# Patient Record
Sex: FEMALE | Race: WHITE | URBAN - METROPOLITAN AREA
[De-identification: names, ages, dates, MRNs, and addresses within clinical notes are randomized per-mention and may not be internally consistent; named-entity substitution may affect disease eponyms.]

---

## 2019-11-08 ENCOUNTER — OFFICE (OUTPATIENT)
Dept: URBAN - METROPOLITAN AREA CLINIC 67 | Facility: CLINIC | Age: 69
End: 2019-11-08

## 2019-11-08 VITALS — SYSTOLIC BLOOD PRESSURE: 135 MMHG | HEIGHT: 62 IN | WEIGHT: 220 LBS | DIASTOLIC BLOOD PRESSURE: 80 MMHG

## 2019-11-08 DIAGNOSIS — Z86.010 PERSONAL HISTORY COLON POLYPS: ICD-10-CM

## 2019-11-08 DIAGNOSIS — R13.10 DYSPHAGIA: ICD-10-CM

## 2019-11-08 PROCEDURE — 99243 OFF/OP CNSLTJ NEW/EST LOW 30: CPT | Performed by: NURSE PRACTITIONER

## 2019-11-08 NOTE — SERVICEHPINOTES
This is a   69   year old  female   seen   in consultation at the request of Dr. ANNA ZHAO  . Patient presents for a surveillance colonoscopy in light of personal hx of TA at her last colonoscopy by Dr Gilliam in 2014.  The patient has no family history of colon cancer or other significant GI diseases. Patient denies fever, nausea, vomiting, abdominal pain, change in bowel habits, constipation, diarrhea, rectal bleeding, melena, and significant change in weight. Reports new onset intermittent dysphagia w/ solids in the setting of occasional night-time heartburn. Denies shortness of breath and chest pain.

## 2020-01-29 ENCOUNTER — AMBULATORY SURGICAL CENTER (OUTPATIENT)
Dept: URBAN - METROPOLITAN AREA SURGERY 38 | Facility: SURGERY | Age: 70
End: 2020-01-29

## 2020-01-29 VITALS
OXYGEN SATURATION: 96 % | SYSTOLIC BLOOD PRESSURE: 120 MMHG | HEIGHT: 62 IN | RESPIRATION RATE: 20 BRPM | WEIGHT: 217 LBS | TEMPERATURE: 97.9 F | DIASTOLIC BLOOD PRESSURE: 63 MMHG | HEART RATE: 83 BPM

## 2020-01-29 DIAGNOSIS — K44.9 DIAPHRAGMATIC HERNIA WITHOUT OBSTRUCTION OR GANGRENE: ICD-10-CM

## 2020-01-29 DIAGNOSIS — K29.70 GASTRITIS, UNSPECIFIED, WITHOUT BLEEDING: ICD-10-CM

## 2020-01-29 DIAGNOSIS — C15.5 MALIGNANT NEOPLASM OF LOWER THIRD OF ESOPHAGUS: ICD-10-CM

## 2020-01-29 PROBLEM — K57.30 DVRTCLOS OF LG INT W/O PERFORATION OR ABSCESS W/O BLEEDING: Status: ACTIVE | Noted: 2020-01-29

## 2020-01-29 PROBLEM — D12.2 BENIGN NEOPLASM OF ASCENDING COLON: Status: ACTIVE | Noted: 2020-01-29

## 2020-01-29 PROBLEM — Z86.010 SURVEILLANCE DUE TO PRIOR COLONIC NEOPLASIA: Status: ACTIVE | Noted: 2020-01-29

## 2020-01-29 LAB — SURGICAL: PDF REPORT: (no result)

## 2020-01-29 PROCEDURE — 43239 EGD BIOPSY SINGLE/MULTIPLE: CPT | Performed by: INTERNAL MEDICINE

## 2020-01-29 PROCEDURE — 45380 COLONOSCOPY AND BIOPSY: CPT | Performed by: INTERNAL MEDICINE

## 2020-02-06 ENCOUNTER — OFFICE (OUTPATIENT)
Dept: URBAN - METROPOLITAN AREA CLINIC 66 | Facility: CLINIC | Age: 70
End: 2020-02-06

## 2020-02-06 VITALS — SYSTOLIC BLOOD PRESSURE: 135 MMHG | DIASTOLIC BLOOD PRESSURE: 90 MMHG | WEIGHT: 215 LBS | HEIGHT: 62 IN

## 2020-02-06 DIAGNOSIS — C15.9 MALIGNANT TUMOR OF ESOPHAGUS: ICD-10-CM

## 2020-02-06 PROCEDURE — 99214 OFFICE O/P EST MOD 30 MIN: CPT | Performed by: INTERNAL MEDICINE

## 2020-02-06 NOTE — SERVICEHPINOTES
ELICEO  KEANE   returns today for follow-up from last visit on   1/29/2020  .    Reviewed egd and path with her and gave copies.

## 2021-03-20 ENCOUNTER — HOSPITAL ENCOUNTER (INPATIENT)
Dept: HOSPITAL 54 - ER | Age: 71
LOS: 16 days | Discharge: HOSPICE | DRG: 64 | End: 2021-04-05
Attending: NURSE PRACTITIONER | Admitting: NURSE PRACTITIONER
Payer: MEDICARE

## 2021-03-20 VITALS — SYSTOLIC BLOOD PRESSURE: 105 MMHG | DIASTOLIC BLOOD PRESSURE: 63 MMHG

## 2021-03-20 VITALS — SYSTOLIC BLOOD PRESSURE: 113 MMHG | DIASTOLIC BLOOD PRESSURE: 71 MMHG

## 2021-03-20 VITALS — HEIGHT: 62 IN | WEIGHT: 183.38 LBS | BODY MASS INDEX: 33.75 KG/M2

## 2021-03-20 DIAGNOSIS — G92: ICD-10-CM

## 2021-03-20 DIAGNOSIS — L30.8: ICD-10-CM

## 2021-03-20 DIAGNOSIS — D69.59: ICD-10-CM

## 2021-03-20 DIAGNOSIS — C15.9: ICD-10-CM

## 2021-03-20 DIAGNOSIS — E43: ICD-10-CM

## 2021-03-20 DIAGNOSIS — R40.2142: ICD-10-CM

## 2021-03-20 DIAGNOSIS — E88.09: ICD-10-CM

## 2021-03-20 DIAGNOSIS — D72.18: ICD-10-CM

## 2021-03-20 DIAGNOSIS — R53.1: ICD-10-CM

## 2021-03-20 DIAGNOSIS — R29.720: ICD-10-CM

## 2021-03-20 DIAGNOSIS — C78.7: ICD-10-CM

## 2021-03-20 DIAGNOSIS — D64.9: ICD-10-CM

## 2021-03-20 DIAGNOSIS — L30.9: ICD-10-CM

## 2021-03-20 DIAGNOSIS — I10: ICD-10-CM

## 2021-03-20 DIAGNOSIS — K74.60: ICD-10-CM

## 2021-03-20 DIAGNOSIS — L51.9: ICD-10-CM

## 2021-03-20 DIAGNOSIS — R40.2242: ICD-10-CM

## 2021-03-20 DIAGNOSIS — Z87.891: ICD-10-CM

## 2021-03-20 DIAGNOSIS — D68.69: ICD-10-CM

## 2021-03-20 DIAGNOSIS — Z66: ICD-10-CM

## 2021-03-20 DIAGNOSIS — E03.9: ICD-10-CM

## 2021-03-20 DIAGNOSIS — R40.2362: ICD-10-CM

## 2021-03-20 DIAGNOSIS — N17.0: ICD-10-CM

## 2021-03-20 DIAGNOSIS — I21.4: ICD-10-CM

## 2021-03-20 DIAGNOSIS — I63.9: Primary | ICD-10-CM

## 2021-03-20 DIAGNOSIS — E86.0: ICD-10-CM

## 2021-03-20 DIAGNOSIS — Z20.822: ICD-10-CM

## 2021-03-20 DIAGNOSIS — J45.901: ICD-10-CM

## 2021-03-20 LAB
ALBUMIN SERPL BCP-MCNC: 2.1 G/DL (ref 3.4–5)
ALP SERPL-CCNC: 294 U/L (ref 46–116)
ALT SERPL W P-5'-P-CCNC: 32 U/L (ref 12–78)
AMMONIA PLAS-SCNC: 15 UMOL/L (ref 11–32)
APAP SERPL-MCNC: < 10 UG/ML (ref 10–30)
AST SERPL W P-5'-P-CCNC: 53 U/L (ref 15–37)
BASOPHILS # BLD AUTO: 0 /CMM (ref 0–0.2)
BASOPHILS NFR BLD AUTO: 0.2 % (ref 0–2)
BILIRUB DIRECT SERPL-MCNC: 0.3 MG/DL (ref 0–0.2)
BILIRUB SERPL-MCNC: 0.7 MG/DL (ref 0.2–1)
BILIRUB UR QL STRIP: (no result)
BUN SERPL-MCNC: 21 MG/DL (ref 7–18)
CALCIUM SERPL-MCNC: 7.9 MG/DL (ref 8.5–10.1)
CHLORIDE SERPL-SCNC: 108 MMOL/L (ref 98–107)
CO2 SERPL-SCNC: 23 MMOL/L (ref 21–32)
COLOR UR: YELLOW
CREAT SERPL-MCNC: 0.9 MG/DL (ref 0.6–1.3)
EOSINOPHIL NFR BLD AUTO: 28.4 % (ref 0–6)
EOSINOPHIL NFR BLD MANUAL: 31 % (ref 0–4)
ETHANOL SERPL-MCNC: < 3 MG/DL (ref 0–0)
GLUCOSE SERPL-MCNC: 85 MG/DL (ref 74–106)
GLUCOSE UR STRIP-MCNC: NEGATIVE MG/DL
HCT VFR BLD AUTO: 38 % (ref 33–45)
HGB BLD-MCNC: 12 G/DL (ref 11.5–14.8)
LEUKOCYTE ESTERASE UR QL STRIP: NEGATIVE
LYMPHOCYTES NFR BLD AUTO: 0.8 /CMM (ref 0.8–4.8)
LYMPHOCYTES NFR BLD AUTO: 4.8 % (ref 20–44)
LYMPHOCYTES NFR BLD MANUAL: 3 % (ref 16–48)
MCHC RBC AUTO-ENTMCNC: 31 G/DL (ref 31–36)
MCV RBC AUTO: 91 FL (ref 82–100)
MONOCYTES NFR BLD AUTO: 1.1 /CMM (ref 0.1–1.3)
MONOCYTES NFR BLD AUTO: 6.9 % (ref 2–12)
MONOCYTES NFR BLD MANUAL: 6 % (ref 0–11)
NEUTROPHILS # BLD AUTO: 9.6 /CMM (ref 1.8–8.9)
NEUTROPHILS NFR BLD AUTO: 59.7 % (ref 43–81)
NEUTS BAND NFR BLD MANUAL: 1 % (ref 0–5)
NEUTS SEG NFR BLD MANUAL: 59 % (ref 42–76)
NITRITE UR QL STRIP: NEGATIVE
PH UR STRIP: 6 [PH] (ref 5–8)
PLATELET # BLD AUTO: 68 /CMM (ref 150–450)
POTASSIUM SERPL-SCNC: 4.7 MMOL/L (ref 3.5–5.1)
PROT SERPL-MCNC: 6.2 G/DL (ref 6.4–8.2)
PROT UR QL STRIP: (no result) MG/DL
RBC # BLD AUTO: 4.19 MIL/UL (ref 4–5.2)
RBC #/AREA URNS HPF: (no result) /HPF (ref 0–2)
SODIUM SERPL-SCNC: 142 MMOL/L (ref 136–145)
TSH SERPL DL<=0.005 MIU/L-ACNC: 3.91 UIU/ML (ref 0.36–3.74)
UROBILINOGEN UR STRIP-MCNC: 0.2 EU/DL
WBC #/AREA URNS HPF: (no result) /HPF (ref 0–3)
WBC NRBC COR # BLD AUTO: 16 K/UL (ref 4.3–11)

## 2021-03-20 PROCEDURE — C9803 HOPD COVID-19 SPEC COLLECT: HCPCS

## 2021-03-20 PROCEDURE — G0480 DRUG TEST DEF 1-7 CLASSES: HCPCS

## 2021-03-20 PROCEDURE — A9575 INJ GADOTERATE MEGLUMI 0.1ML: HCPCS

## 2021-03-20 PROCEDURE — C9113 INJ PANTOPRAZOLE SODIUM, VIA: HCPCS

## 2021-03-20 PROCEDURE — G0378 HOSPITAL OBSERVATION PER HR: HCPCS

## 2021-03-20 RX ADMIN — LORATADINE SCH MG: 10 TABLET ORAL at 18:13

## 2021-03-20 RX ADMIN — SODIUM CHLORIDE PRN MLS/HR: 4.5 INJECTION, SOLUTION INTRAVENOUS at 17:31

## 2021-03-20 NOTE — NUR
TELE RN OPENING NOTES: RECEIVED PATIENT IN BED,AWAKE. A/O X3. NO S/S OF DISTRESS NOTED. CALL 
LIGHT WITHIN REACH. BED ALARM ON. BED IN LOWEST ANDLOCKED POSITION. HOB ELEVATED.

## 2021-03-20 NOTE — NUR
THE PATIENT IS BIBRA 102 FROM HOME C/O ALTERED MENTAL STATUS AND PER REPORT "WE 
FOUND THE PT ON THE FLOOR". THE PATIENT IS ALERT AND ORIENTED TO SELF AND 
PLACE. ABLE TO MAKE NEEDS KNOWN VERBALLY. IN ROOM AIR AND DENIES SOB. 
RESPIRATION REGULAR AND UNLABORED. DENIES PAIN. THE PATIENT CAME WITH LAC G 18. 
ALSO, NOTED LEFT UPPER CHEST UNDERSKIN PORT A CATH. THE PATIENT IS PROVIDED 
WITH A WARM BLANKET FOR COMFORT. ATTACHED TO THE MONITOR. WILL CONTINUE TO 
MONITOR.

.

## 2021-03-20 NOTE — NUR
TELE RN ADMITTING NOTES



RECEIVED PATIENT VIA [facility] IN MEDICALLY STABLE CONDITION. VITAL SIGNS: BP: 113/71 HR 87 RR 
18 TEMP 97.6 O2 96%

SAFETY PRECAUTIONS IN PLACE; BED IN LOW POSITION AND LOCKED, RAILS UP X2, CALL LIGHT WITHIN 
REACH. WILL CONTINUE TO MONITOR PATIENT.

## 2021-03-20 NOTE — NUR
TELE RN CLOSING NOTES



PATIENT RESTING IN BED, A/O X2. PATIENT ON ROOM AIR; BREATHING EVEN AND UNLABORED; NO SOB 
NOTED AT THIS TIME. NO COMPLAINS OF PAIN. TELE MONITOR WITH A CURRENT READING OF SR 87. IV 
ACCESS PRESENT ON LAC G #18; INFUSING .45NS @75 MLS/HR. SAFETY PRECAUTIONS IN PLACE; BED IN 
LOW POSITION AND LOCKED, RAILS UP X2, CALL LIGHT WITHIN REACH. WILL ENDORSE TO NIGHT SHIFT 
NURSE.

## 2021-03-21 VITALS — SYSTOLIC BLOOD PRESSURE: 117 MMHG | DIASTOLIC BLOOD PRESSURE: 65 MMHG

## 2021-03-21 VITALS — DIASTOLIC BLOOD PRESSURE: 63 MMHG | SYSTOLIC BLOOD PRESSURE: 109 MMHG

## 2021-03-21 VITALS — DIASTOLIC BLOOD PRESSURE: 77 MMHG | SYSTOLIC BLOOD PRESSURE: 114 MMHG

## 2021-03-21 VITALS — DIASTOLIC BLOOD PRESSURE: 76 MMHG | SYSTOLIC BLOOD PRESSURE: 125 MMHG

## 2021-03-21 LAB
ALBUMIN SERPL BCP-MCNC: 1.9 G/DL (ref 3.4–5)
ALP SERPL-CCNC: 289 U/L (ref 46–116)
ALT SERPL W P-5'-P-CCNC: 27 U/L (ref 12–78)
AST SERPL W P-5'-P-CCNC: 52 U/L (ref 15–37)
BASOPHILS # BLD AUTO: 0 /CMM (ref 0–0.2)
BASOPHILS NFR BLD AUTO: 0.3 % (ref 0–2)
BILIRUB SERPL-MCNC: 0.5 MG/DL (ref 0.2–1)
BUN SERPL-MCNC: 25 MG/DL (ref 7–18)
CALCIUM SERPL-MCNC: 7.5 MG/DL (ref 8.5–10.1)
CHLORIDE SERPL-SCNC: 110 MMOL/L (ref 98–107)
CHOLEST SERPL-MCNC: 131 MG/DL (ref ?–200)
CO2 SERPL-SCNC: 20 MMOL/L (ref 21–32)
CREAT SERPL-MCNC: 0.9 MG/DL (ref 0.6–1.3)
EOSINOPHIL NFR BLD AUTO: 39.5 % (ref 0–6)
EOSINOPHIL NFR BLD MANUAL: 35 % (ref 0–4)
FERRITIN SERPL-MCNC: 346 NG/ML (ref 8–388)
GLUCOSE SERPL-MCNC: 85 MG/DL (ref 74–106)
HCT VFR BLD AUTO: 35 % (ref 33–45)
HDLC SERPL-MCNC: 30 MG/DL (ref 40–60)
HGB BLD-MCNC: 11.2 G/DL (ref 11.5–14.8)
IRON SERPL-MCNC: 38 UG/DL (ref 50–175)
LDLC SERPL DIRECT ASSAY-MCNC: 83 MG/DL (ref 0–99)
LYMPHOCYTES NFR BLD AUTO: 1 /CMM (ref 0.8–4.8)
LYMPHOCYTES NFR BLD AUTO: 6.7 % (ref 20–44)
LYMPHOCYTES NFR BLD MANUAL: 7 % (ref 16–48)
MAGNESIUM SERPL-MCNC: 2.4 MG/DL (ref 1.8–2.4)
MCHC RBC AUTO-ENTMCNC: 32 G/DL (ref 31–36)
MCV RBC AUTO: 92 FL (ref 82–100)
MONOCYTES NFR BLD AUTO: 1.1 /CMM (ref 0.1–1.3)
MONOCYTES NFR BLD AUTO: 7.5 % (ref 2–12)
MONOCYTES NFR BLD MANUAL: 6 % (ref 0–11)
NEUTROPHILS # BLD AUTO: 6.6 /CMM (ref 1.8–8.9)
NEUTROPHILS NFR BLD AUTO: 46 % (ref 43–81)
NEUTS SEG NFR BLD MANUAL: 52 % (ref 42–76)
PHOSPHATE SERPL-MCNC: 2.8 MG/DL (ref 2.5–4.9)
PLATELET # BLD AUTO: 67 /CMM (ref 150–450)
POTASSIUM SERPL-SCNC: 4.3 MMOL/L (ref 3.5–5.1)
PROT SERPL-MCNC: 5.8 G/DL (ref 6.4–8.2)
RBC # BLD AUTO: 3.82 MIL/UL (ref 4–5.2)
SODIUM SERPL-SCNC: 141 MMOL/L (ref 136–145)
TIBC SERPL-MCNC: 200 UG/DL (ref 250–450)
TRIGL SERPL-MCNC: 119 MG/DL (ref 30–150)
WBC NRBC COR # BLD AUTO: 14.4 K/UL (ref 4.3–11)

## 2021-03-21 RX ADMIN — Medication SCH MG: at 16:04

## 2021-03-21 RX ADMIN — ALBUTEROL SULFATE SCH MG: 2.5 SOLUTION RESPIRATORY (INHALATION) at 16:04

## 2021-03-21 RX ADMIN — ALBUTEROL SULFATE SCH MG: 2.5 SOLUTION RESPIRATORY (INHALATION) at 19:22

## 2021-03-21 RX ADMIN — ALBUTEROL SULFATE SCH MG: 2.5 SOLUTION RESPIRATORY (INHALATION) at 09:37

## 2021-03-21 RX ADMIN — SODIUM CHLORIDE PRN MLS/HR: 4.5 INJECTION, SOLUTION INTRAVENOUS at 18:24

## 2021-03-21 RX ADMIN — Medication SCH MG: at 09:36

## 2021-03-21 RX ADMIN — SODIUM CHLORIDE PRN MLS/HR: 4.5 INJECTION, SOLUTION INTRAVENOUS at 07:36

## 2021-03-21 RX ADMIN — METOPROLOL TARTRATE SCH MG: 50 TABLET, FILM COATED ORAL at 17:22

## 2021-03-21 RX ADMIN — Medication SCH MG: at 11:30

## 2021-03-21 RX ADMIN — METOPROLOL TARTRATE SCH MG: 50 TABLET, FILM COATED ORAL at 23:23

## 2021-03-21 RX ADMIN — Medication SCH GM: at 17:24

## 2021-03-21 RX ADMIN — Medication SCH MG: at 19:22

## 2021-03-21 RX ADMIN — ALBUTEROL SULFATE SCH MG: 2.5 SOLUTION RESPIRATORY (INHALATION) at 12:18

## 2021-03-21 RX ADMIN — Medication SCH GM: at 11:45

## 2021-03-21 RX ADMIN — LORATADINE SCH MG: 10 TABLET ORAL at 08:15

## 2021-03-21 RX ADMIN — METOPROLOL TARTRATE SCH MG: 50 TABLET, FILM COATED ORAL at 12:00

## 2021-03-21 NOTE — NUR
MS/RN  CLOSING NOTE



PATIENT IS AWAKE IN BED. A/O X 3. NO ACUTE DISTRESS NOTED, DENIES ANY PAIN AT THIS TIME. 
PATIENT ON ROOM AIR TOLERATING WELL, NO SOB NOTED, BREATHING EVEN, NON LABORED. SAFETY 
MEASURES IN PLACE, BED LOCKED AND IN LOWEST POSITION, CALL LIGHT WITHIN REACH. ALL NEEDS MET 
THROUGHOUT THE SHIFT. WILL ENDORSE TO NIGHT SHIFT NURSE.

## 2021-03-21 NOTE — NUR
MS/RN  OPENING NOTE



RECEIVED PATIENT FROM NIGHT SHIFT NURSE.

PATIENT IS AWAKE IN BED. A/O X 3. NO ACUTE DISTRESS NOTED, DENIES ANY PAIN AT THIS TIME. 
PATIENT ON ROOM AIR TOLERATING WELL, NO SOB NOTED, BREATHING EVEN, NON LABORED. PER NIGHT 
SHIFT REPORT PATIENT DID NOT VOID AND HAD BLADDER SCANNED SHOWING 253ML, WITH NO COMPLAINTS 
OF PAIN/DISCOMFORT, PRESSURE, OR URGENCY. NIGHT SHIFT NURSE NOTIFIED MD, AWAITING RESPONSE. 
SAFETY MEASURES IN PLACE, BED LOCKED AND IN LOWEST POSITION, CALL LIGHT WITHIN REACH. WILL 
CONTINUE TO MONITOR AND ENSURE SAFETY.

## 2021-03-21 NOTE — NUR
RN NOTES

NEW ORDER OF METHYLPREDNISONE, PER SISTER AND BA WHEELER, TO HOLD MEDICATION, NEEDS 
APPROVAL OF PATIENT'S REGULAR ONCOLOGIST. ASKED PATIENT ABOUT INFORMATION ON ONCOLOGIST, 
PATIENT UNABLE TO GIVE INFORMATION DUE TO WEAKNESS, UNABLE TO TALK, ONLY MOUTH'S WORDS. 
SISTER ALSO AGREEABLE TO HOLD MEDICATION FOR NOW.

-------------------------------------------------------------------------------

Addendum: 03/22/21 at 0328 by EAGLE ROSAS RN

-------------------------------------------------------------------------------

PATIENT AGREEABLE TO HOLD STEROIDS FOR NOW.

## 2021-03-22 VITALS — DIASTOLIC BLOOD PRESSURE: 58 MMHG | SYSTOLIC BLOOD PRESSURE: 104 MMHG

## 2021-03-22 VITALS — SYSTOLIC BLOOD PRESSURE: 132 MMHG | DIASTOLIC BLOOD PRESSURE: 81 MMHG

## 2021-03-22 VITALS — SYSTOLIC BLOOD PRESSURE: 111 MMHG | DIASTOLIC BLOOD PRESSURE: 68 MMHG

## 2021-03-22 VITALS — SYSTOLIC BLOOD PRESSURE: 129 MMHG | DIASTOLIC BLOOD PRESSURE: 69 MMHG

## 2021-03-22 VITALS — DIASTOLIC BLOOD PRESSURE: 80 MMHG | SYSTOLIC BLOOD PRESSURE: 122 MMHG

## 2021-03-22 LAB
ALBUMIN SERPL BCP-MCNC: 1.7 G/DL (ref 3.4–5)
ALP SERPL-CCNC: 236 U/L (ref 46–116)
ALT SERPL W P-5'-P-CCNC: 22 U/L (ref 12–78)
AST SERPL W P-5'-P-CCNC: 53 U/L (ref 15–37)
BASOPHILS # BLD AUTO: 0 /CMM (ref 0–0.2)
BASOPHILS NFR BLD AUTO: 0.2 % (ref 0–2)
BILIRUB SERPL-MCNC: 0.7 MG/DL (ref 0.2–1)
BUN SERPL-MCNC: 24 MG/DL (ref 7–18)
CALCIUM SERPL-MCNC: 7.1 MG/DL (ref 8.5–10.1)
CHLORIDE SERPL-SCNC: 110 MMOL/L (ref 98–107)
CO2 SERPL-SCNC: 21 MMOL/L (ref 21–32)
CREAT SERPL-MCNC: 0.8 MG/DL (ref 0.6–1.3)
EOSINOPHIL NFR BLD AUTO: 34.3 % (ref 0–6)
EOSINOPHIL NFR BLD MANUAL: 33 % (ref 0–4)
FERRITIN SERPL-MCNC: 374 NG/ML (ref 8–388)
GLUCOSE SERPL-MCNC: 88 MG/DL (ref 74–106)
HCT VFR BLD AUTO: 37 % (ref 33–45)
HGB BLD-MCNC: 11.5 G/DL (ref 11.5–14.8)
IRON SERPL-MCNC: 47 UG/DL (ref 50–175)
LYMPHOCYTES NFR BLD AUTO: 0.9 /CMM (ref 0.8–4.8)
LYMPHOCYTES NFR BLD AUTO: 5.5 % (ref 20–44)
LYMPHOCYTES NFR BLD MANUAL: 0 % (ref 16–48)
MAGNESIUM SERPL-MCNC: 2.3 MG/DL (ref 1.8–2.4)
MCHC RBC AUTO-ENTMCNC: 31 G/DL (ref 31–36)
MCV RBC AUTO: 93 FL (ref 82–100)
MONOCYTES NFR BLD AUTO: 1.1 /CMM (ref 0.1–1.3)
MONOCYTES NFR BLD AUTO: 6.5 % (ref 2–12)
MONOCYTES NFR BLD MANUAL: 4 % (ref 0–11)
NEUTROPHILS # BLD AUTO: 9.1 /CMM (ref 1.8–8.9)
NEUTROPHILS NFR BLD AUTO: 53.5 % (ref 43–81)
NEUTS SEG NFR BLD MANUAL: 63 % (ref 42–76)
PHOSPHATE SERPL-MCNC: 2.4 MG/DL (ref 2.5–4.9)
PLATELET # BLD AUTO: 47 /CMM (ref 150–450)
POTASSIUM SERPL-SCNC: 4.4 MMOL/L (ref 3.5–5.1)
PROT SERPL-MCNC: 5.4 G/DL (ref 6.4–8.2)
RBC # BLD AUTO: 3.95 MIL/UL (ref 4–5.2)
SODIUM SERPL-SCNC: 141 MMOL/L (ref 136–145)
TIBC SERPL-MCNC: 190 UG/DL (ref 250–450)
WBC NRBC COR # BLD AUTO: 17 K/UL (ref 4.3–11)

## 2021-03-22 RX ADMIN — METOPROLOL TARTRATE SCH MG: 50 TABLET, FILM COATED ORAL at 17:18

## 2021-03-22 RX ADMIN — Medication SCH GM: at 08:50

## 2021-03-22 RX ADMIN — LORATADINE SCH MG: 10 TABLET ORAL at 08:51

## 2021-03-22 RX ADMIN — ALBUTEROL SULFATE SCH MG: 2.5 SOLUTION RESPIRATORY (INHALATION) at 20:23

## 2021-03-22 RX ADMIN — SODIUM CHLORIDE PRN MLS/HR: 4.5 INJECTION, SOLUTION INTRAVENOUS at 06:49

## 2021-03-22 RX ADMIN — Medication SCH MG: at 20:23

## 2021-03-22 RX ADMIN — ALBUTEROL SULFATE SCH MG: 2.5 SOLUTION RESPIRATORY (INHALATION) at 11:10

## 2021-03-22 RX ADMIN — LACTULOSE SCH MLS/HR: 20 SOLUTION ORAL at 20:30

## 2021-03-22 RX ADMIN — Medication SCH MG: at 15:25

## 2021-03-22 RX ADMIN — METOPROLOL TARTRATE SCH MG: 50 TABLET, FILM COATED ORAL at 12:00

## 2021-03-22 RX ADMIN — Medication SCH GM: at 17:23

## 2021-03-22 RX ADMIN — METOPROLOL TARTRATE SCH MG: 50 TABLET, FILM COATED ORAL at 05:52

## 2021-03-22 RX ADMIN — LACTULOSE SCH MLS/HR: 20 SOLUTION ORAL at 11:49

## 2021-03-22 RX ADMIN — Medication SCH MG: at 08:27

## 2021-03-22 RX ADMIN — Medication SCH MG: at 11:11

## 2021-03-22 RX ADMIN — SODIUM CHLORIDE PRN MLS/HR: 4.5 INJECTION, SOLUTION INTRAVENOUS at 17:18

## 2021-03-22 RX ADMIN — LACTULOSE SCH MLS/HR: 20 SOLUTION ORAL at 15:55

## 2021-03-22 RX ADMIN — ALBUTEROL SULFATE SCH MG: 2.5 SOLUTION RESPIRATORY (INHALATION) at 15:25

## 2021-03-22 RX ADMIN — ALBUTEROL SULFATE SCH MG: 2.5 SOLUTION RESPIRATORY (INHALATION) at 08:27

## 2021-03-22 NOTE — NUR
RN Closing note



Patient in bed, lethargic condition but stable all vital signs. Continue to given Lactulose 
enema for high ammonia level. Patient done ST evel and kept hold oral intake include 
medications.due to patient is not awake. Faxed over and requested medical record to [facility] 
oncology/Dr. Ruby Meadows. Respiratory even and unlabored on room air, skin is warm to touch, 
keep clean/dry, skin care provided. Kept elevated HOB for ensure airway and aspiration 
precaution,  also lowest bed position for safety. Call light within reach, will endorse 
night shift.

## 2021-03-22 NOTE — NUR
Patient seen by ST who recommended hold oral intake or medications due to does not awake 
condition.

## 2021-03-22 NOTE — NUR
:



 consult was requested per MD as the patient lives alone. Patient is a 
70-year-old, white female. Per ED physician, patient was admitted on 03/20/2021 due to 
altered mental status after being found on the floor of her home ([address] [place] [address]; [phone]).



SW attempted to meet with the patient in her hospital room on the med-surgical unit. Patient 
was lethargic and therefore SW was unable to interview the patient.



MELVI spoke to KARLA Suarez who had reached out to the patients sister Radha ([phone]). KARLA Suarez provided this SW with Sangeeta contact information. 



MELVI called patients sister Radha, and spoke to Radha to discuss coordination of care. 
Radha mentioned that the patient is independent but has had some trouble standing for 
the past couple of weeks. MELVI asked Radha if the patient is independent with her ADLs 
and she reported that the patient can care for herself. MELVI asked Radha about the 
patients finances and she reported that the patient receives pension, Medicare, and Social 
Security Income. MELVI discussed discharge plan with Radha: home v. alternative option. 
Patient's family is unsure about discharge options at this time. 



MELVI will work nursing and case management, as needed, to ensure a safe and proper discharge 
plan.  SW will follow-up with patient, and will remain available to the patient and family, 
as needed.

## 2021-03-22 NOTE — NUR
RN Opening note



Received patient in bed, lethargic. Patient having US ABD. Respiratory even and unlabored on 
room air. Skin is warm to touch, keep clean/dry, intact IV site, and keep remains intact 
placement. Kept elevated HOB for ensure airway and aspiration precaution, also lowest bed 
position for safety. Noticed PLT 47, Trop 1.884 and MD aware NNO. Call light within reach, 
will continue to monitor.

## 2021-03-22 NOTE — NUR
RN Opening note



Received patient in bed, AO x 2-3, able to responds all stimuli. Patient having US ABD. 
Respiratory even and unlabored on room air. Skin is warm to touch, keep clean/dry, intact IV 
site, and keep remains intact placement. Kept elevated HOB for ensure airway and aspiration 
precaution, also lowest bed position for safety. Call light within reach, will continue to 
monitor.

-------------------------------------------------------------------------------

Addendum: 03/22/21 at 0815 by ANGELICA KRAUSE RN

-------------------------------------------------------------------------------

Error

## 2021-03-22 NOTE — NUR
RN NOTES PM SHIFT

LETHARGIC, AROUSEABLE BY VOICE AND TOUCH, ALERT AND ORIENTED X1, ON ROOM AIR, NO COMPLAIN OF 
PAIN, UNABLE TO TALK, MOUTHS WORDS, SHAKES AND NOD HEAD FOR YES OR NO, HX OF ESOPHAGEAL 
CANCER, SR ON THE TELE, PER DR. ALONZO, RULE OUT AUTO IMMUNE INDUCED ENCEPHALOPATHY, AM LABS. 
WILL NEED MEDICAL RECORDS FROM [facility] ONCOLOGY DR. TERESA LEE [phone]



NOTIFIED DR. TUCKER REGARDING TROPONIN 1.884, NO NEW ORDER, ALSO NOTIFIED DR. ALONZO REGARDING 
PLATELET OF 47, AWAITING RESPONSE.

## 2021-03-23 VITALS — DIASTOLIC BLOOD PRESSURE: 66 MMHG | SYSTOLIC BLOOD PRESSURE: 96 MMHG

## 2021-03-23 VITALS — DIASTOLIC BLOOD PRESSURE: 57 MMHG | SYSTOLIC BLOOD PRESSURE: 113 MMHG

## 2021-03-23 VITALS — SYSTOLIC BLOOD PRESSURE: 108 MMHG | DIASTOLIC BLOOD PRESSURE: 76 MMHG

## 2021-03-23 VITALS — SYSTOLIC BLOOD PRESSURE: 90 MMHG | DIASTOLIC BLOOD PRESSURE: 56 MMHG

## 2021-03-23 VITALS — DIASTOLIC BLOOD PRESSURE: 80 MMHG | SYSTOLIC BLOOD PRESSURE: 118 MMHG

## 2021-03-23 LAB
ALBUMIN SERPL ELPH-MCNC: 1.9 G/DL (ref 2.9–4.4)
ALBUMIN/GLOB SERPL: 0.6 {RATIO} (ref 0.7–1.7)
ALPHA1 GLOB SERPL ELPH-MCNC: 0.4 G/DL (ref 0–0.4)
ALPHA2 GLOB SERPL ELPH-MCNC: 0.7 G/DL (ref 0.4–1)
B-GLOBULIN SERPL ELPH-MCNC: 0.8 G/DL (ref 0.7–1.3)
BASOPHILS # BLD AUTO: 0 /CMM (ref 0–0.2)
BASOPHILS NFR BLD AUTO: 0.2 % (ref 0–2)
BUN SERPL-MCNC: 30 MG/DL (ref 7–18)
CALCIUM SERPL-MCNC: 7 MG/DL (ref 8.5–10.1)
CENTROMERE B AB SER-ACNC: <0.2 AI (ref 0–0.9)
CHLORIDE SERPL-SCNC: 110 MMOL/L (ref 98–107)
CHROMATIN AB SERPL-ACNC: <0.2 AI (ref 0–0.9)
CO2 SERPL-SCNC: 20 MMOL/L (ref 21–32)
CREAT SERPL-MCNC: 0.8 MG/DL (ref 0.6–1.3)
DSDNA AB SER-ACNC: <1 IU/ML (ref 0–9)
ENA JO1 AB SER-ACNC: <0.2 AI (ref 0–0.9)
ENA RNP AB SER-ACNC: <0.2 AI (ref 0–0.9)
ENA SCL70 AB SER-ACNC: <0.2 AI (ref 0–0.9)
ENA SM AB SER-ACNC: <0.2 AI (ref 0–0.9)
ENA SS-A AB SER-ACNC: <0.2 AI (ref 0–0.9)
ENA SS-B AB SER-ACNC: <0.2 AI (ref 0–0.9)
EOSINOPHIL NFR BLD AUTO: 0.4 % (ref 0–6)
GAMMA GLOB SERPL ELPH-MCNC: 1.3 G/DL (ref 0.4–1.8)
GLOBULIN SER CALC-MCNC: 3.1 G/DL (ref 2.2–3.9)
GLUCOSE SERPL-MCNC: 142 MG/DL (ref 74–106)
HCT VFR BLD AUTO: 34 % (ref 33–45)
HGB BLD-MCNC: 10.7 G/DL (ref 11.5–14.8)
IGA SERPL-MCNC: 283 MG/DL (ref 87–352)
IGM SERPL-MCNC: 116 MG/DL (ref 26–217)
LYMPHOCYTES NFR BLD AUTO: 0.9 /CMM (ref 0.8–4.8)
LYMPHOCYTES NFR BLD AUTO: 7.6 % (ref 20–44)
MAGNESIUM SERPL-MCNC: 2.6 MG/DL (ref 1.8–2.4)
MCHC RBC AUTO-ENTMCNC: 31 G/DL (ref 31–36)
MCV RBC AUTO: 94 FL (ref 82–100)
MONOCYTES NFR BLD AUTO: 1 /CMM (ref 0.1–1.3)
MONOCYTES NFR BLD AUTO: 8.7 % (ref 2–12)
NEUTROPHILS # BLD AUTO: 9.6 /CMM (ref 1.8–8.9)
NEUTROPHILS NFR BLD AUTO: 83.1 % (ref 43–81)
PHOSPHATE SERPL-MCNC: 2.9 MG/DL (ref 2.5–4.9)
PLATELET # BLD AUTO: 57 /CMM (ref 150–450)
POTASSIUM SERPL-SCNC: 4.1 MMOL/L (ref 3.5–5.1)
RBC # BLD AUTO: 3.62 MIL/UL (ref 4–5.2)
SODIUM SERPL-SCNC: 140 MMOL/L (ref 136–145)
WBC NRBC COR # BLD AUTO: 11.5 K/UL (ref 4.3–11)

## 2021-03-23 PROCEDURE — 0HBBXZX EXCISION OF RIGHT UPPER ARM SKIN, EXTERNAL APPROACH, DIAGNOSTIC: ICD-10-PCS

## 2021-03-23 RX ADMIN — DEXTROSE MONOHYDRATE SCH MLS/HR: 50 INJECTION, SOLUTION INTRAVENOUS at 01:16

## 2021-03-23 RX ADMIN — DEXTROSE MONOHYDRATE SCH MLS/HR: 50 INJECTION, SOLUTION INTRAVENOUS at 20:41

## 2021-03-23 RX ADMIN — DEXTROSE MONOHYDRATE SCH MLS/HR: 50 INJECTION, SOLUTION INTRAVENOUS at 17:21

## 2021-03-23 RX ADMIN — METOPROLOL TARTRATE SCH MG: 50 TABLET, FILM COATED ORAL at 12:00

## 2021-03-23 RX ADMIN — ALBUTEROL SULFATE SCH MG: 2.5 SOLUTION RESPIRATORY (INHALATION) at 15:41

## 2021-03-23 RX ADMIN — Medication SCH MG: at 15:41

## 2021-03-23 RX ADMIN — LACTULOSE SCH MLS/HR: 20 SOLUTION ORAL at 00:33

## 2021-03-23 RX ADMIN — LACTULOSE SCH MLS/HR: 20 SOLUTION ORAL at 20:39

## 2021-03-23 RX ADMIN — ALBUTEROL SULFATE SCH MG: 2.5 SOLUTION RESPIRATORY (INHALATION) at 10:27

## 2021-03-23 RX ADMIN — METOPROLOL TARTRATE SCH MG: 50 TABLET, FILM COATED ORAL at 00:00

## 2021-03-23 RX ADMIN — METOPROLOL TARTRATE SCH MG: 50 TABLET, FILM COATED ORAL at 23:54

## 2021-03-23 RX ADMIN — LACTULOSE SCH MLS/HR: 20 SOLUTION ORAL at 15:22

## 2021-03-23 RX ADMIN — LACTULOSE SCH MLS/HR: 20 SOLUTION ORAL at 04:36

## 2021-03-23 RX ADMIN — ALBUTEROL SULFATE SCH MG: 2.5 SOLUTION RESPIRATORY (INHALATION) at 20:20

## 2021-03-23 RX ADMIN — LACTULOSE SCH MLS/HR: 20 SOLUTION ORAL at 13:07

## 2021-03-23 RX ADMIN — ALBUTEROL SULFATE SCH MG: 2.5 SOLUTION RESPIRATORY (INHALATION) at 07:33

## 2021-03-23 RX ADMIN — METOPROLOL TARTRATE SCH MG: 50 TABLET, FILM COATED ORAL at 06:00

## 2021-03-23 RX ADMIN — Medication SCH GM: at 17:21

## 2021-03-23 RX ADMIN — LACTULOSE SCH MLS/HR: 20 SOLUTION ORAL at 08:57

## 2021-03-23 RX ADMIN — Medication SCH MG: at 20:20

## 2021-03-23 RX ADMIN — LACTULOSE SCH MLS/HR: 20 SOLUTION ORAL at 23:56

## 2021-03-23 RX ADMIN — DEXTROSE AND SODIUM CHLORIDE PRN MLS/HR: 5; 450 INJECTION, SOLUTION INTRAVENOUS at 15:13

## 2021-03-23 RX ADMIN — DEXTROSE MONOHYDRATE SCH MLS/HR: 50 INJECTION, SOLUTION INTRAVENOUS at 00:38

## 2021-03-23 RX ADMIN — Medication SCH MG: at 10:27

## 2021-03-23 RX ADMIN — Medication SCH GM: at 09:00

## 2021-03-23 RX ADMIN — Medication SCH MG: at 07:33

## 2021-03-23 RX ADMIN — LORATADINE SCH MG: 10 TABLET ORAL at 08:56

## 2021-03-23 RX ADMIN — METOPROLOL TARTRATE SCH MG: 50 TABLET, FILM COATED ORAL at 17:21

## 2021-03-23 RX ADMIN — Medication SCH ML: at 21:00

## 2021-03-23 NOTE — NUR
ASA AND PLAVIX MEDS CRUSHED GIVEN WITH PUDDING, GIVEN AT SLOW RATE ONE TEASPOON SIP OF WATER 
AT A TIME, PATIENT ABLE TO SWALLOW WITHOUT DIFFICULTIES AND NO COUGHING. PATIENT ON 90 
DEGREES POSITION. SWALLOW SAFETY PRECAUTIONS FOLLOWED AS RECOMMENDED BY THE SPEECH 
THERAPIST, SIGN POSTED IN THE ROOM. PATIENT IS FULLY AWAKE. FOLLOWS COMMANDS LIKE WHEN ASKED 
HER TO OPEN HER MOUTH, PATIENT FOLLOWED. WILL KEEP THE UPRIGHT POSITION AT FOR 30 MINS AFTER 
THE MED.

## 2021-03-23 NOTE — NUR
MS RN CLOSING NOTE



PATIENT IN BED. LETHARGIC, SEEN SOME IMPROVEMENTS THIS LATE AFTERNOON. PATIENT WAS OPENING 
HER EYES. CONSUMED BOWL HALF OF PUREE. OBEYS SIMPLE COMMANDS, SQUEEZE HANDS UPON COMMAND. 
CURRENTLY ON NC AT 3 LPM. NO SOB NOTED. IN NO APPARENT DISTRESS. IV ACCESS ON R HAND #22 G, 
INTACT AND PATENT, D5 1/2 NS RUNNING @ 70 ML/HR. ROUTINE MEDS WERE GIVEN AS ORDERED. CHANGED 
BED TO SPECIALTY MATTRESS. SAFETY/FALL/ASPIRATION PRECAUTIONS OBSERVED. HOB ELEVATED. SIDE 
RAILS UP X2. CALL LIGHT WITHIN REACH. WILL ENDORSE CONTINUITY OF CARE TO ONCOMING SHIFT.

## 2021-03-23 NOTE — NUR
RN NOTE



RECEIVED PATIENT IN BED. LETHARGIC, NOT RESPONSIVE. ON 3 LPM, NC. NO SOB NOTED. IN NO 
APPARENT DISTRESS. IV ACCESS ON R HAND #22 G, INTACT, 1/2 NS RUNNING @ 75 ML/HR. 
SAFETY/FALL/ASPIRATION PRECAUTIONS OBSERVED. HOB ELEVATED. SIDE RAILS UP X2. CALL LIGHT 
WITHIN REACH. WILL CONTINUE PLAN OF CARE.

## 2021-03-23 NOTE — NUR
MS RN CLOSING NOTES: PATIENT IN BED, STILL LETHARGIC, COUGHS OCCASIONALLY,NO PHLEGM. HOB 
ELEVATED AT ALL TIMES. NO S/S OF DISTRESS NOTED. CALL LIGHT WITHIN REACH. BED ALARM ON. BED 
IN LOWEST AND LOCKED POSITION. OCCASIONALLY SCRATCHES HER SKIN ON THE NECK PART. HEELS 
OFFLOADED. TURNED AND REPOSITIONED [id number]. STILL WITH GENERALIZED RASHES. LACTULOSE RECTAL 
IRRIGATION DONE [id number]. KEPT PATIENT DRY AND CLEANED. WITH O2 AT 3L/MIN NASAL CANNULA.

## 2021-03-23 NOTE — NUR
MS RN OPENING NOTES: RECEIVED PT IN BED, AWAKE, LEFT EYE FULLY OPEN, RIGHT EYE SLIGHTLY 
OPEN. ASKED IF SHE IS OKAY, PATIENT ANSWERED BACK BY NODDING HER HEAD. ASKED IF SHE CAN 
SQUISH MY HAND AND PATIENT DID WITH HER RIGHT HAND SLIGHTLY, LEFT HAND IS MORE WEAKER THAN 
THE RIGHT HAND BUT SHE TRIED. HOB ELEVATED AT 35 DEGREES. NO S/S OF DISTRESS NOTED. CALL 
LIGHT WITHIN REACH AND PATIENT IS HOLDING IT. BED ALARM ON. BED IN LOWEST AND LOCKED 
POSITION. ON O2 AT 3L/MIN NASLA CANNULA.

## 2021-03-23 NOTE — NUR
RN NOTE



PHONE CONSENT FROM THE PT'S SISTER (SHARONDA) FOR PUNCH BIOPSY OF THE SKIN. CONSENT SIGNED 
WITH BRANDON GARCIA AND I AS A WITNESS.

## 2021-03-23 NOTE — NUR
MS RN NOTE



HELD PO MED. PATIENT WAS SEEN LETHARGIC AND NOT FULLY AWAKE. PT IS AT RISK FOR ASPIRATION. 
WILL CONTINUE TO MONITOR THROUGHOUT THE SHIFT.

## 2021-03-24 VITALS — SYSTOLIC BLOOD PRESSURE: 118 MMHG | DIASTOLIC BLOOD PRESSURE: 68 MMHG

## 2021-03-24 VITALS — DIASTOLIC BLOOD PRESSURE: 71 MMHG | SYSTOLIC BLOOD PRESSURE: 112 MMHG

## 2021-03-24 VITALS — SYSTOLIC BLOOD PRESSURE: 115 MMHG | DIASTOLIC BLOOD PRESSURE: 65 MMHG

## 2021-03-24 VITALS — DIASTOLIC BLOOD PRESSURE: 73 MMHG | SYSTOLIC BLOOD PRESSURE: 113 MMHG

## 2021-03-24 VITALS — DIASTOLIC BLOOD PRESSURE: 74 MMHG | SYSTOLIC BLOOD PRESSURE: 129 MMHG

## 2021-03-24 LAB
BASOPHILS # BLD AUTO: 0.2 /CMM (ref 0–0.2)
BASOPHILS NFR BLD AUTO: 1.1 % (ref 0–2)
BUN SERPL-MCNC: 27 MG/DL (ref 7–18)
CALCIUM SERPL-MCNC: 7 MG/DL (ref 8.5–10.1)
CHLORIDE SERPL-SCNC: 112 MMOL/L (ref 98–107)
CO2 SERPL-SCNC: 20 MMOL/L (ref 21–32)
CREAT SERPL-MCNC: 0.8 MG/DL (ref 0.6–1.3)
EOSINOPHIL NFR BLD AUTO: 0.2 % (ref 0–6)
GLUCOSE SERPL-MCNC: 116 MG/DL (ref 74–106)
HCT VFR BLD AUTO: 37 % (ref 33–45)
HEMOCCULT STL QL: POSITIVE
HGB BLD-MCNC: 11.4 G/DL (ref 11.5–14.8)
LYMPHOCYTES NFR BLD AUTO: 0.6 /CMM (ref 0.8–4.8)
LYMPHOCYTES NFR BLD AUTO: 4.6 % (ref 20–44)
LYMPHOCYTES NFR BLD MANUAL: 7 % (ref 16–48)
MAGNESIUM SERPL-MCNC: 2.8 MG/DL (ref 1.8–2.4)
MCHC RBC AUTO-ENTMCNC: 31 G/DL (ref 31–36)
MCV RBC AUTO: 93 FL (ref 82–100)
MONOCYTES NFR BLD AUTO: 1 /CMM (ref 0.1–1.3)
MONOCYTES NFR BLD AUTO: 7.4 % (ref 2–12)
MONOCYTES NFR BLD MANUAL: 7 % (ref 0–11)
NEUTROPHILS # BLD AUTO: 11.9 /CMM (ref 1.8–8.9)
NEUTROPHILS NFR BLD AUTO: 86.7 % (ref 43–81)
NEUTS SEG NFR BLD MANUAL: 86 % (ref 42–76)
PLATELET # BLD AUTO: 80 /CMM (ref 150–450)
POTASSIUM SERPL-SCNC: 4.1 MMOL/L (ref 3.5–5.1)
RBC # BLD AUTO: 3.97 MIL/UL (ref 4–5.2)
SODIUM SERPL-SCNC: 141 MMOL/L (ref 136–145)
WBC NRBC COR # BLD AUTO: 13.7 K/UL (ref 4.3–11)

## 2021-03-24 RX ADMIN — Medication SCH MG: at 11:02

## 2021-03-24 RX ADMIN — Medication SCH MG: at 19:35

## 2021-03-24 RX ADMIN — METOPROLOL TARTRATE SCH MG: 50 TABLET, FILM COATED ORAL at 05:33

## 2021-03-24 RX ADMIN — Medication SCH ML: at 17:00

## 2021-03-24 RX ADMIN — DEXTROSE AND SODIUM CHLORIDE PRN MLS/HR: 5; 450 INJECTION, SOLUTION INTRAVENOUS at 09:01

## 2021-03-24 RX ADMIN — LACTULOSE SCH MLS/HR: 20 SOLUTION ORAL at 05:18

## 2021-03-24 RX ADMIN — ALBUTEROL SULFATE SCH MG: 2.5 SOLUTION RESPIRATORY (INHALATION) at 07:51

## 2021-03-24 RX ADMIN — DEXTROSE AND SODIUM CHLORIDE PRN MLS/HR: 5; 450 INJECTION, SOLUTION INTRAVENOUS at 22:49

## 2021-03-24 RX ADMIN — DEXTROSE MONOHYDRATE SCH MLS/HR: 50 INJECTION, SOLUTION INTRAVENOUS at 20:51

## 2021-03-24 RX ADMIN — ALBUTEROL SULFATE SCH MG: 2.5 SOLUTION RESPIRATORY (INHALATION) at 11:02

## 2021-03-24 RX ADMIN — LACTULOSE SCH MLS/HR: 20 SOLUTION ORAL at 15:00

## 2021-03-24 RX ADMIN — Medication SCH GM: at 09:46

## 2021-03-24 RX ADMIN — METOPROLOL TARTRATE SCH MG: 50 TABLET, FILM COATED ORAL at 12:00

## 2021-03-24 RX ADMIN — METOPROLOL TARTRATE SCH MG: 50 TABLET, FILM COATED ORAL at 18:00

## 2021-03-24 RX ADMIN — LACTULOSE SCH MLS/HR: 20 SOLUTION ORAL at 22:57

## 2021-03-24 RX ADMIN — Medication SCH MG: at 15:18

## 2021-03-24 RX ADMIN — METOPROLOL TARTRATE SCH MG: 50 TABLET, FILM COATED ORAL at 22:58

## 2021-03-24 RX ADMIN — LACTULOSE SCH MLS/HR: 20 SOLUTION ORAL at 09:45

## 2021-03-24 RX ADMIN — DEXTROSE MONOHYDRATE SCH MLS/HR: 50 INJECTION, SOLUTION INTRAVENOUS at 09:47

## 2021-03-24 RX ADMIN — Medication SCH ML: at 09:46

## 2021-03-24 RX ADMIN — ALBUTEROL SULFATE SCH MG: 2.5 SOLUTION RESPIRATORY (INHALATION) at 15:18

## 2021-03-24 RX ADMIN — Medication SCH MG: at 07:51

## 2021-03-24 RX ADMIN — LORATADINE SCH MG: 10 TABLET ORAL at 09:46

## 2021-03-24 RX ADMIN — Medication SCH GM: at 18:24

## 2021-03-24 RX ADMIN — ASPIRIN 81 MG SCH MG: 81 TABLET ORAL at 09:47

## 2021-03-24 RX ADMIN — HEPARIN SODIUM PRN MLS/HR: 5000 INJECTION, SOLUTION INTRAVENOUS at 15:57

## 2021-03-24 RX ADMIN — LACTULOSE SCH MLS/HR: 20 SOLUTION ORAL at 16:01

## 2021-03-24 RX ADMIN — LACTULOSE SCH MLS/HR: 20 SOLUTION ORAL at 19:00

## 2021-03-24 RX ADMIN — ALBUTEROL SULFATE SCH MG: 2.5 SOLUTION RESPIRATORY (INHALATION) at 19:34

## 2021-03-24 NOTE — NUR
Send messages to Dr Lemus RE: if it's okay to hold the lactulose irrigations for tonight and 
the morning doses due to patient is on heparin drip now and had an episode of black stool 
and positive for stool OB.

## 2021-03-24 NOTE — NUR
MS RN CLOSING NOTES: PATIENT IN BED, AWAKE, ALERT, NO S/S OF DISTRESS NOTED. CALL LIGHT 
WITHIN REACH. BED ALARM ON. BED IN LOWEST AND LOCKED POSITION. HOB ELEVATED AT ALL TIMES. 
OFFLOADED. TURNED AND REPOSITIONED Q2 HOURS.

## 2021-03-24 NOTE — NUR
DR CUMMINGS CALLED BACK AND INFORMED OF qJEC=039.3 SECONDS, MD STATED TO FOLLOW THE PROTOCOL. 
CHARGE NURSE NEYDA MADE AWARE.

## 2021-03-24 NOTE — NUR
SS NOTE: 

SW attempted to meet with pt. to complete PHQ9 for code Stroke. However, The pt. is not 
awake or alert, with eyes closed and not rousable to verbal cues. SW called pt.'s name put 
multiple times and no response from pt. MELVI spoke to charge nurse, Luma who stated that the 
Hospitalist, Erick Carrillo will speak to the family about placing this pt. on Hospice. Noted. 
SW will be available as needed.

## 2021-03-24 NOTE — NUR
MS RN

DR. MCNULTY ORDERED HEPARIN DRIP TO BE INITIATED, WILL CALL AND MADE HER AWARE,PATIENT HAS DARK 
STOOL, POSITIVE OCCULT NOTED, AND DR. TUCKER DOES NOT WANT TO INITIATE ANTI COAG THIS TIME.

## 2021-03-24 NOTE — NUR
[place] INPATIENT ENCOUNTER  INFECTIOUS DISEASE - DAILY PROGRESS NOTE      ASSESSMENT:      SOB, cough, respiratory failure- still +  CAP, bilateral pneumonia    ESRD started on HD  UTI- asymptomatic, K pneumoniae isolated  Leukocytosis- improving          PLAN:    Cont IV cefepime, azithromycin   CXR with worsening infiltrate    Will order CT chest  pulmonary eval  Sputum cs    Strep, legionella ag not taken yet, will re order    D/W Dr.Paul JUNIE Wilks, DO      ________________________________________________________________  Ather H MD Robert    INTERVAL HISTORY: refused CXR yesterday, feels better, no dysuria, afebrile no nausea, emesis, diarrhea    MEDICATIONS:    • metoPROLOL  50 mg Oral 2 times per day   • iron sucrose (VENOFER) injection/IVPB  200 mg Intravenous Daily   • nystatin   Topical 2 times per day   • epoetin fransico  10,000 Units Subcutaneous Once per day on Tue Thu Sat   • B complex-vitamin C-folic acid  1 tablet Oral Daily   • azithromycin  250 mg Oral Daily   • amLODIPine  10 mg Oral Nightly   • atorvastatin  20 mg Oral Nightly   • [START ON 11/12/2017] ergocalciferol  50,000 Units Oral Once per day on Sun   • imipramine  25 mg Oral Nightly   • levothyroxine  125 mcg Oral Nightly   • oxybutynin  15 mg Oral Nightly   • sodium bicarbonate  650 mg Oral BID   • sodium chloride (PF)  2 mL Injection 2 times per day   • heparin (porcine)  5,000 Units Subcutaneous 3 times per day   • ceFEPime (MAXIPIME) IVPB for Most Indications  1,000 mg Intravenous Daily         Plan for antibiotics: orals in am      HISTORIES:  Allergies, data, and recent notes reviewed.    REVIEW OF SYSTEMS:      No fever, chills.   + improvement in shortness of breath.    No chest pain or palpitations.    No diarrhea, nausea or vomiting.    No dysuria or frequency.    No rash.    OBJECTIVE:    VITAL SIGNS:  Vital Last Value 24 Hour Range   Temperature 98.3 °F (36.8 °C) (11/10/17 0829) Temp  Min: 98.3 °F (36.8 °C)  Max: 98.5 °F (36.9 °C)  SENT MESSAGES TO DR CUMMINGS RE: CLARIFICATIONS OF THE ORDER IF MD WANTS TO HOLD THE HEPARIN 
DRIP FOR AN HOUR THEN DECREASE DRIP BY 3 UNITS/KG/H, WAITING FOR THE MD RESPONSE.   Pulse 68 (11/10/17 1013) Pulse  Min: 68  Max: 72   Respiratory 18 (11/09/17 1449) Resp  Min: 18  Max: 18   Non-Invasive  Blood Pressure 126/64 (11/10/17 1013) BP  Min: 120/60  Max: 128/76   Pulse Oximetry 98 % (11/10/17 0829) SpO2  Min: 88 %  Max: 98 %     INTAKE/OUTPUT:  Last Stool Occurrence: 1 (11/07/17 1316)      PHYSICAL EXAM:    General: no distress  HEENT:  Anicteric.  Oropharynx is moist. There are no lip lesions or oral thrush.  Cardiovascular:  Regular rate and rhythm.  Pulmonary: basal crackles  Abdomen:  Bowel sounds are normoactive. Soft, non-tender, non-distended.  : no costovertebral angle tenderness  Extremities:  No edema.  Skin:  No rash.  IV Site(s):  No erythema or tenderness.    LABORATORY DATA:  Last 24 hour labs were reviewed in detail.      Recent Labs  Lab 11/10/17  0500 11/08/17  0513 11/07/17  0935 11/06/17  1419   WBC 14.1* 13.3* 19.8* 22.8*   HCT 27.8* 27.3* 29.6* 30.1*    313 423 382   AST  --   --  21 25   GPT  --   --  34 36      Recent Labs  Lab 11/09/17  0510 11/08/17  0513 11/07/17  0935 11/06/17  1419   CREATININE 3.62* 4.54* 6.41* 6.65*   PCT  --   --   --  0.68*          URINALYSIS:     Recent Labs  Lab 11/06/17  1645   USPG 1.015   UPH 6.0   UROB 0.2   UWBC SMALL*   UBACTR LARGE*   UNITR POSITIVE*   LEUK >100*   URBC MODERATE*          Microbiology:      Specimen Description (no units)   Date Value   11/06/2017 URINE, CLEAN CATCH/MIDSTREAM   11/06/2017 BLOOD HAND, LEFT   11/06/2017 BLOOD, ANTECUBITAL LEFT   09/22/2015 URINE, CLEAN CATCH/MIDSTREAM       CULTURE (no units)   Date Value   11/06/2017 (P)    >100,000 CFU/mL KLEBSIELLA PNEUMONIAE SSP.PNEUMONIAE   11/06/2017 NO GROWTH 4 DAYS.   11/06/2017 NO GROWTH 4 DAYS.   09/22/2015 (P)    >100,000 CFU/mL CITROBACTER FREUNDII Low/mid level AmpC beta-lactamase detected. Repeat cultures & susceptibility testing at 3 to 5 days may be warranted if patient is not clinically responding.   09/22/2015 (P)    >100,000 CFU/mL  CITROBACTER FREUNDII SECOND TYPE Low/mid level AmpC beta-lactamase detected. Repeat cultures & susceptibility testing at 3 to 5 days may be warranted if patient is not clinically responding.          Radiology:  Reviewed.    Discussed with:  Nurse and Patient

## 2021-03-24 NOTE — NUR
TELE RN OPENING NOTES: RECEIVED PATIENT IN BED, AWAKE, EYES ARE FULLY OPEN. HOB ELEVATED AT 
35 DEGREES. BED ALARM ON. BED IN LOWEST AND LOCKED POSITION. ON HEPARIN DRIP [id number] 
UNITS/HOUR. PATIENT IS NPO. ACCORDING TO THE DAYSHIFT RN PATIENT WAS ABLE TO TALK. WITH O2 
AT 3L/MIN NASAL CANNULA.

## 2021-03-24 NOTE — NUR
MS OPENING NOTE



PATIENT ALERT, ABLE TO OPEN EYES AND NOD HEAD WITH VERBAL AND TACTILE. ABLE TO FOLLOW SOME 
INSTRUCTIONS. RASHES/HIVES THROUGHOUT THE BODY OBSERVED. IV SITE INTACT AND PATENT. 
ASPIRATION PRECAUTIONS IN PLACE: HOB ELEVATED AT ALL TIMES. SAFETY PRECAUTIONS IN PLACE. BED 
IN LOWEST POSITION, LOCKED IN PLACE, AND SIDE RAILS UP. WILL CONTINUE TO MONITOR.

## 2021-03-25 VITALS — SYSTOLIC BLOOD PRESSURE: 125 MMHG | DIASTOLIC BLOOD PRESSURE: 62 MMHG

## 2021-03-25 VITALS — SYSTOLIC BLOOD PRESSURE: 120 MMHG | DIASTOLIC BLOOD PRESSURE: 68 MMHG

## 2021-03-25 VITALS — SYSTOLIC BLOOD PRESSURE: 120 MMHG | DIASTOLIC BLOOD PRESSURE: 77 MMHG

## 2021-03-25 VITALS — DIASTOLIC BLOOD PRESSURE: 77 MMHG | SYSTOLIC BLOOD PRESSURE: 126 MMHG

## 2021-03-25 VITALS — DIASTOLIC BLOOD PRESSURE: 76 MMHG | SYSTOLIC BLOOD PRESSURE: 134 MMHG

## 2021-03-25 VITALS — SYSTOLIC BLOOD PRESSURE: 114 MMHG | DIASTOLIC BLOOD PRESSURE: 68 MMHG

## 2021-03-25 LAB
BASOPHILS # BLD AUTO: 0 /CMM (ref 0–0.2)
BASOPHILS NFR BLD AUTO: 0.2 % (ref 0–2)
BUN SERPL-MCNC: 22 MG/DL (ref 7–18)
CALCIUM SERPL-MCNC: 6.3 MG/DL (ref 8.5–10.1)
CHLORIDE SERPL-SCNC: 111 MMOL/L (ref 98–107)
CO2 SERPL-SCNC: 21 MMOL/L (ref 21–32)
CREAT SERPL-MCNC: 0.8 MG/DL (ref 0.6–1.3)
EOSINOPHIL NFR BLD AUTO: 39.2 % (ref 0–6)
EOSINOPHIL NFR BLD MANUAL: 34 % (ref 0–4)
GLUCOSE SERPL-MCNC: 112 MG/DL (ref 74–106)
HCT VFR BLD AUTO: 35 % (ref 33–45)
HGB BLD-MCNC: 11.1 G/DL (ref 11.5–14.8)
LYMPHOCYTES NFR BLD AUTO: 0.9 /CMM (ref 0.8–4.8)
LYMPHOCYTES NFR BLD AUTO: 4.1 % (ref 20–44)
LYMPHOCYTES NFR BLD MANUAL: 2 % (ref 16–48)
MAGNESIUM SERPL-MCNC: 2.3 MG/DL (ref 1.8–2.4)
MCHC RBC AUTO-ENTMCNC: 32 G/DL (ref 31–36)
MCV RBC AUTO: 91 FL (ref 82–100)
MONOCYTES NFR BLD AUTO: 1.2 /CMM (ref 0.1–1.3)
MONOCYTES NFR BLD AUTO: 5.5 % (ref 2–12)
MONOCYTES NFR BLD MANUAL: 3 % (ref 0–11)
NEUTROPHILS # BLD AUTO: 11 /CMM (ref 1.8–8.9)
NEUTROPHILS NFR BLD AUTO: 51 % (ref 43–81)
NEUTS SEG NFR BLD MANUAL: 61 % (ref 42–76)
PLATELET # BLD AUTO: 91 /CMM (ref 150–450)
POTASSIUM SERPL-SCNC: 3.7 MMOL/L (ref 3.5–5.1)
RBC # BLD AUTO: 3.82 MIL/UL (ref 4–5.2)
SODIUM SERPL-SCNC: 143 MMOL/L (ref 136–145)
WBC NRBC COR # BLD AUTO: 21.6 K/UL (ref 4.3–11)

## 2021-03-25 RX ADMIN — DEXTROSE MONOHYDRATE SCH MLS/HR: 50 INJECTION, SOLUTION INTRAVENOUS at 04:41

## 2021-03-25 RX ADMIN — HEPARIN SODIUM PRN MLS/HR: 5000 INJECTION, SOLUTION INTRAVENOUS at 12:37

## 2021-03-25 RX ADMIN — LACTULOSE SCH MLS/HR: 20 SOLUTION ORAL at 11:00

## 2021-03-25 RX ADMIN — ASPIRIN 81 MG SCH MG: 81 TABLET ORAL at 08:30

## 2021-03-25 RX ADMIN — DEXTROSE MONOHYDRATE SCH MLS/HR: 50 INJECTION, SOLUTION INTRAVENOUS at 21:49

## 2021-03-25 RX ADMIN — ALBUTEROL SULFATE SCH MG: 2.5 SOLUTION RESPIRATORY (INHALATION) at 07:34

## 2021-03-25 RX ADMIN — Medication SCH ML: at 08:00

## 2021-03-25 RX ADMIN — Medication SCH GM: at 17:59

## 2021-03-25 RX ADMIN — Medication SCH MG: at 07:34

## 2021-03-25 RX ADMIN — LACTULOSE SCH MLS/HR: 20 SOLUTION ORAL at 06:08

## 2021-03-25 RX ADMIN — ALBUTEROL SULFATE SCH MG: 2.5 SOLUTION RESPIRATORY (INHALATION) at 10:53

## 2021-03-25 RX ADMIN — METOPROLOL TARTRATE SCH MG: 50 TABLET, FILM COATED ORAL at 06:00

## 2021-03-25 RX ADMIN — Medication SCH MG: at 14:55

## 2021-03-25 RX ADMIN — LACTULOSE SCH MLS/HR: 20 SOLUTION ORAL at 03:00

## 2021-03-25 RX ADMIN — ALBUTEROL SULFATE SCH MG: 2.5 SOLUTION RESPIRATORY (INHALATION) at 14:55

## 2021-03-25 RX ADMIN — METOPROLOL TARTRATE SCH MG: 50 TABLET, FILM COATED ORAL at 13:43

## 2021-03-25 RX ADMIN — Medication SCH ML: at 13:44

## 2021-03-25 RX ADMIN — Medication SCH ML: at 17:58

## 2021-03-25 RX ADMIN — Medication SCH MG: at 10:53

## 2021-03-25 RX ADMIN — LORATADINE SCH MG: 10 TABLET ORAL at 08:30

## 2021-03-25 RX ADMIN — ALBUTEROL SULFATE SCH MG: 2.5 SOLUTION RESPIRATORY (INHALATION) at 19:30

## 2021-03-25 RX ADMIN — Medication SCH GM: at 09:25

## 2021-03-25 RX ADMIN — DEXTROSE MONOHYDRATE SCH MLS/HR: 50 INJECTION, SOLUTION INTRAVENOUS at 22:16

## 2021-03-25 RX ADMIN — METOPROLOL TARTRATE SCH MG: 50 TABLET, FILM COATED ORAL at 18:12

## 2021-03-25 RX ADMIN — Medication SCH MG: at 19:30

## 2021-03-25 NOTE — NUR
TELE RN CLOSING NOTES: PATIENT IN BED, AWAKE, ALERT. EARLIER THIS MORNING PATIENT SAID" 
ITCHY". NO S/S OF DISTRESS NOTED. CALL LIGHT WITHIN REACH. BED ALARM ON. BED IN LOWEST AND 
LOCKED POSITION. HOB ELEVATED AT ALL TIMES. NO BLEEDING NOTED. NO MELENA. NO COMPLAIN OF 
PAIN. ON HEPARIN DRIP AT 1200 UNITS/HOUR. TURNED AND REPOSITIONED Q 2HOURS. HEELS OFFLOADED. 
NEXT aPTT DRAW WILL BE AT 0715, WILL ENDORSE TO THE NEXT SHIFT RN.

## 2021-03-25 NOTE — NUR
MS RN NOTE



NOTIFIED DR. ALONZO AND BRANDON TATUM NP ABOUT CRITICAL LAB VALUE APTT .5 AS WELL AS 
HEPARIN DRIP PROTOCOL. INSTRUCTED BY MD AND NP TO FOLLOW PROTOCOL. WILL CONTINUE TO PAUSE 
HEPARIN DRIP UNTIL 0915 AND DECREASED HEPARIN DRIP UNITS/ HR  UNITS/ HOUR IN 
COMPLIANCE WITH THE PROTOCOL. NEXT APTT BLOOD DRAW AT 1500 TODAY. CHARGE NURSE AWARE.

## 2021-03-25 NOTE — NUR
Patient had a BM, greenish, liquidy, moderate amount, cleansed with soap and water, pat dry, 
and mepilex foam dressing placed on the sacral area. Turned patient to the right side, heels 
offloaded.HOB elevated at 35 degrees.

## 2021-03-25 NOTE — NUR
TELE RN OPENING NOTE



RECEIVED PATIENT IN BED, EYES CLOSED, EASILY AWAKEN. PATIENT SOMEWHAT VERBAL NOW. SAID 
"OKAY" AND "THANK YOU" WHEN I UPDATED THE BOARD AND RE-INTRODUCED MYSELF. RASHES/HIVES STILL 
PRESENT THROUGHOUT THE BODY. HEPARIN DRIP AT 1200 UNITS/ HR. TOLERATING 3 L OF OXYGEN WITH 
97% SATURATION. SAFETY MEASURES IN PLACE: BED IN LOCKED AND LOWEST POSITION, SIDE RAILS UP, 
AND HOURLY ROUNDING. PATIENT CONTINUES TO BE NPO. ASPIRATION PRECAUTIONS IN PLACE. WILL 
CONTINUE TO MONITOR. 

-------------------------------------------------------------------------------

Addendum: 03/25/21 at 1037 by PRATIK GONZALEZ RN

-------------------------------------------------------------------------------

NOTE AT 0530

## 2021-03-25 NOTE — NUR
HEPARIN DRIP RESUMED AT 1200 UNITS/ HOUR. NEXT aPTT DRAW WILL BE AT 0715, WILL ENDORSE TO 
THE NEXT SHIFT RN.

## 2021-03-26 VITALS — SYSTOLIC BLOOD PRESSURE: 121 MMHG | DIASTOLIC BLOOD PRESSURE: 67 MMHG

## 2021-03-26 VITALS — DIASTOLIC BLOOD PRESSURE: 58 MMHG | SYSTOLIC BLOOD PRESSURE: 102 MMHG

## 2021-03-26 VITALS — SYSTOLIC BLOOD PRESSURE: 126 MMHG | DIASTOLIC BLOOD PRESSURE: 69 MMHG

## 2021-03-26 VITALS — DIASTOLIC BLOOD PRESSURE: 66 MMHG | SYSTOLIC BLOOD PRESSURE: 111 MMHG

## 2021-03-26 VITALS — DIASTOLIC BLOOD PRESSURE: 63 MMHG | SYSTOLIC BLOOD PRESSURE: 108 MMHG

## 2021-03-26 LAB
BASOPHILS # BLD AUTO: 0 /CMM (ref 0–0.2)
BASOPHILS NFR BLD AUTO: 0.1 % (ref 0–2)
BUN SERPL-MCNC: 21 MG/DL (ref 7–18)
CALCIUM SERPL-MCNC: 6.2 MG/DL (ref 8.5–10.1)
CHLORIDE SERPL-SCNC: 111 MMOL/L (ref 98–107)
CO2 SERPL-SCNC: 26 MMOL/L (ref 21–32)
CREAT SERPL-MCNC: 0.7 MG/DL (ref 0.6–1.3)
EOSINOPHIL NFR BLD AUTO: 6.3 % (ref 0–6)
GLUCOSE SERPL-MCNC: 108 MG/DL (ref 74–106)
HCT VFR BLD AUTO: 31 % (ref 33–45)
HGB BLD-MCNC: 9.7 G/DL (ref 11.5–14.8)
LYMPHOCYTES NFR BLD AUTO: 0.8 /CMM (ref 0.8–4.8)
LYMPHOCYTES NFR BLD AUTO: 8 % (ref 20–44)
MAGNESIUM SERPL-MCNC: 2.2 MG/DL (ref 1.8–2.4)
MCHC RBC AUTO-ENTMCNC: 32 G/DL (ref 31–36)
MCV RBC AUTO: 92 FL (ref 82–100)
MONOCYTES NFR BLD AUTO: 0.9 /CMM (ref 0.1–1.3)
MONOCYTES NFR BLD AUTO: 8.2 % (ref 2–12)
NEUTROPHILS # BLD AUTO: 8.2 /CMM (ref 1.8–8.9)
NEUTROPHILS NFR BLD AUTO: 77.4 % (ref 43–81)
PLATELET # BLD AUTO: 74 /CMM (ref 150–450)
POTASSIUM SERPL-SCNC: 3.8 MMOL/L (ref 3.5–5.1)
RBC # BLD AUTO: 3.3 MIL/UL (ref 4–5.2)
SODIUM SERPL-SCNC: 142 MMOL/L (ref 136–145)
WBC NRBC COR # BLD AUTO: 10.6 K/UL (ref 4.3–11)

## 2021-03-26 RX ADMIN — ALBUTEROL SULFATE SCH MG: 2.5 SOLUTION RESPIRATORY (INHALATION) at 19:37

## 2021-03-26 RX ADMIN — METOPROLOL TARTRATE SCH MG: 50 TABLET, FILM COATED ORAL at 01:18

## 2021-03-26 RX ADMIN — LORATADINE SCH MG: 10 TABLET ORAL at 08:52

## 2021-03-26 RX ADMIN — DEXTROSE MONOHYDRATE SCH MLS/HR: 50 INJECTION, SOLUTION INTRAVENOUS at 17:00

## 2021-03-26 RX ADMIN — DEXTROSE AND SODIUM CHLORIDE PRN MLS/HR: 5; 450 INJECTION, SOLUTION INTRAVENOUS at 06:58

## 2021-03-26 RX ADMIN — Medication SCH APPLIC: at 08:53

## 2021-03-26 RX ADMIN — METOPROLOL TARTRATE SCH MG: 50 TABLET, FILM COATED ORAL at 23:03

## 2021-03-26 RX ADMIN — Medication SCH MG: at 08:05

## 2021-03-26 RX ADMIN — ASPIRIN 81 MG SCH MG: 81 TABLET ORAL at 08:51

## 2021-03-26 RX ADMIN — SODIUM CHLORIDE SCH MG: 9 INJECTION, SOLUTION INTRAVENOUS at 17:30

## 2021-03-26 RX ADMIN — ALBUTEROL SULFATE SCH MG: 2.5 SOLUTION RESPIRATORY (INHALATION) at 08:05

## 2021-03-26 RX ADMIN — METOPROLOL TARTRATE SCH MG: 50 TABLET, FILM COATED ORAL at 17:32

## 2021-03-26 RX ADMIN — Medication SCH MG: at 14:34

## 2021-03-26 RX ADMIN — Medication SCH ML: at 17:37

## 2021-03-26 RX ADMIN — METOPROLOL TARTRATE SCH MG: 50 TABLET, FILM COATED ORAL at 12:00

## 2021-03-26 RX ADMIN — ALBUTEROL SULFATE SCH MG: 2.5 SOLUTION RESPIRATORY (INHALATION) at 14:34

## 2021-03-26 RX ADMIN — DEXTROSE AND SODIUM CHLORIDE PRN MLS/HR: 5; 450 INJECTION, SOLUTION INTRAVENOUS at 21:17

## 2021-03-26 RX ADMIN — Medication SCH MG: at 10:49

## 2021-03-26 RX ADMIN — Medication SCH ML: at 08:51

## 2021-03-26 RX ADMIN — DEXTROSE MONOHYDRATE SCH MLS/HR: 50 INJECTION, SOLUTION INTRAVENOUS at 20:19

## 2021-03-26 RX ADMIN — Medication SCH APPLIC: at 17:39

## 2021-03-26 RX ADMIN — Medication SCH MG: at 19:37

## 2021-03-26 RX ADMIN — ALBUTEROL SULFATE SCH MG: 2.5 SOLUTION RESPIRATORY (INHALATION) at 10:49

## 2021-03-26 RX ADMIN — METOPROLOL TARTRATE SCH MG: 50 TABLET, FILM COATED ORAL at 05:51

## 2021-03-26 NOTE — NUR
TELE RN NOTES

PATIENT IN BED ALERT TO SELF.  NO ACUTE DISTRESS NOTED. BREATHING UNLABORED.NO FACIAL 
GRIMACING NOTED. IV ACCESS PATENT AND INTACT, NO BLEEDING . NO SWELLING NOTED. SAFETY 
MEASURES IN PLACE. CALL LIGHT WITHIN REACH. WILL CONTINUE TO MONITOR ACCORDINGLY

## 2021-03-26 NOTE — NUR
WOUND CARE CONSULT: PT PRESENTS WITH RESOLVING GENERALIZED BLOTCHY RED AREAS ON BODY, 
PRESENT ON ADMISSION PER NURSING STAFF. PT NOTED TO HAVE AREAS OF SKIN DISCOLORATION ON 
UPPER EXTREMITIES AND ALSO INCONTINENCE ASSOCIATED SKIN DAMAGE TO GLUTEAL CREASE. 
RECOMMENDATIONS MADE FOR SKIN PROTECTION. DISCUSSED WITH  NURSING STAFF. PT IS ON [facility] AIRSS BED.  MD IN AGREEMENT WITH PLAN OF CARE.

-------------------------------------------------------------------------------

Addendum: 03/26/21 at 0915 by MARILYN SUAREZ WNDNU

-------------------------------------------------------------------------------

Amended: Links added.

## 2021-03-26 NOTE — NUR
RN MS opening notes

Received Pt from morning nurse. Pt is resting in bed comfortably. Pt is alert and 
orientedX1. Respiration on 2 L NC. No SOB. No S/S of distress noted. IV site at R hand#22 is 
clean,intact and infusing well D5 1/2 NS@ 70 ml/hr. IV site at R forearm # 22 is clean, 
intact and SL. Safety precautions is maintained. Bed at low position, brakes locked, side 
rails upX2, hob elevated and call light is within reach. Will continue to monitor. 

-------------------------------------------------------------------------------

Addendum: 03/27/21 at 0639 by ALVIN CHRISTIAN RN

-------------------------------------------------------------------------------

Pt is on 3 L NC. No SOB. No S/S of distress noted.

## 2021-03-26 NOTE — NUR
TELE RN NOTES

PATIENT IN BED ALERT TO SELF.  NO ACUTE DISTRESS NOTED. BREATHING UNLABORED.NO FACIAL 
GRIMACING NOTED. IV ACCESS PATENT AND INTACT, NO BLEEDING . NO SWELLING NOTED. NEEDS 
ATTENDED AND ANTICIPATED. SAFETY MEASURES IN PLACE. CALL LIGHT WITHIN REACH. WILL ENDORSE TO 
NIGHT NURSE FOR CONTINUITY OF CARE.

## 2021-03-26 NOTE — NUR
TELE RN NOTES



AWAKE & RESPONSIVE. NOT IN ANY DISTRESS. NO SOB NOTED. DENIES ANY PAIN OR DISCOMFORT AT THIS 
TIME. ON TELE SR @ 78 WITH IVF INFUSING WELL. AM CARE DONE. MONITORED ACCORDINGLY. CALL 
LIGHT WITHIN REACH. BED IN LOWEST POSITION. SR UP X 3 WITH BED ALARM ON FOR SAFETY. WILL 
ENDORSE TO NEXT SHIFT.

## 2021-03-27 VITALS — DIASTOLIC BLOOD PRESSURE: 69 MMHG | SYSTOLIC BLOOD PRESSURE: 115 MMHG

## 2021-03-27 VITALS — DIASTOLIC BLOOD PRESSURE: 76 MMHG | SYSTOLIC BLOOD PRESSURE: 110 MMHG

## 2021-03-27 VITALS — SYSTOLIC BLOOD PRESSURE: 110 MMHG | DIASTOLIC BLOOD PRESSURE: 68 MMHG

## 2021-03-27 LAB
BASOPHILS # BLD AUTO: 0 /CMM (ref 0–0.2)
BASOPHILS NFR BLD AUTO: 0.2 % (ref 0–2)
BUN SERPL-MCNC: 18 MG/DL (ref 7–18)
CALCIUM SERPL-MCNC: 6.2 MG/DL (ref 8.5–10.1)
CHLORIDE SERPL-SCNC: 110 MMOL/L (ref 98–107)
CO2 SERPL-SCNC: 28 MMOL/L (ref 21–32)
CREAT SERPL-MCNC: 0.7 MG/DL (ref 0.6–1.3)
EOSINOPHIL NFR BLD AUTO: 8.1 % (ref 0–6)
GLUCOSE SERPL-MCNC: 88 MG/DL (ref 74–106)
HCT VFR BLD AUTO: 31 % (ref 33–45)
HGB BLD-MCNC: 9.8 G/DL (ref 11.5–14.8)
LYMPHOCYTES NFR BLD AUTO: 0.7 /CMM (ref 0.8–4.8)
LYMPHOCYTES NFR BLD AUTO: 6.7 % (ref 20–44)
MAGNESIUM SERPL-MCNC: 2.1 MG/DL (ref 1.8–2.4)
MCHC RBC AUTO-ENTMCNC: 32 G/DL (ref 31–36)
MCV RBC AUTO: 94 FL (ref 82–100)
MONOCYTES NFR BLD AUTO: 0.7 /CMM (ref 0.1–1.3)
MONOCYTES NFR BLD AUTO: 7.1 % (ref 2–12)
NEUTROPHILS # BLD AUTO: 7.6 /CMM (ref 1.8–8.9)
NEUTROPHILS NFR BLD AUTO: 77.9 % (ref 43–81)
PLATELET # BLD AUTO: 86 /CMM (ref 150–450)
POTASSIUM SERPL-SCNC: 4 MMOL/L (ref 3.5–5.1)
RBC # BLD AUTO: 3.28 MIL/UL (ref 4–5.2)
SODIUM SERPL-SCNC: 142 MMOL/L (ref 136–145)
WBC NRBC COR # BLD AUTO: 9.8 K/UL (ref 4.3–11)

## 2021-03-27 RX ADMIN — METOPROLOL TARTRATE SCH MG: 50 TABLET, FILM COATED ORAL at 05:12

## 2021-03-27 RX ADMIN — Medication SCH ML: at 16:50

## 2021-03-27 RX ADMIN — METOPROLOL TARTRATE SCH MG: 50 TABLET, FILM COATED ORAL at 17:35

## 2021-03-27 RX ADMIN — ALBUTEROL SULFATE SCH MG: 2.5 SOLUTION RESPIRATORY (INHALATION) at 19:46

## 2021-03-27 RX ADMIN — SODIUM CHLORIDE SCH MG: 9 INJECTION, SOLUTION INTRAVENOUS at 17:35

## 2021-03-27 RX ADMIN — ASPIRIN 81 MG SCH MG: 81 TABLET ORAL at 08:29

## 2021-03-27 RX ADMIN — Medication SCH ML: at 12:19

## 2021-03-27 RX ADMIN — ALBUTEROL SULFATE SCH MG: 2.5 SOLUTION RESPIRATORY (INHALATION) at 15:32

## 2021-03-27 RX ADMIN — DEXTROSE MONOHYDRATE SCH MLS/HR: 50 INJECTION, SOLUTION INTRAVENOUS at 11:09

## 2021-03-27 RX ADMIN — METOPROLOL TARTRATE SCH MG: 50 TABLET, FILM COATED ORAL at 12:00

## 2021-03-27 RX ADMIN — LORATADINE SCH MG: 10 TABLET ORAL at 08:29

## 2021-03-27 RX ADMIN — Medication SCH APPLIC: at 16:51

## 2021-03-27 RX ADMIN — Medication SCH APPLIC: at 08:27

## 2021-03-27 RX ADMIN — ALBUTEROL SULFATE SCH MG: 2.5 SOLUTION RESPIRATORY (INHALATION) at 11:15

## 2021-03-27 RX ADMIN — ALBUTEROL SULFATE SCH MG: 2.5 SOLUTION RESPIRATORY (INHALATION) at 07:50

## 2021-03-27 RX ADMIN — Medication SCH MG: at 11:15

## 2021-03-27 RX ADMIN — METOPROLOL TARTRATE SCH MG: 50 TABLET, FILM COATED ORAL at 23:58

## 2021-03-27 RX ADMIN — Medication SCH MG: at 15:32

## 2021-03-27 RX ADMIN — SODIUM CHLORIDE SCH MG: 9 INJECTION, SOLUTION INTRAVENOUS at 08:29

## 2021-03-27 RX ADMIN — Medication SCH MG: at 07:50

## 2021-03-27 RX ADMIN — DEXTROSE MONOHYDRATE SCH MLS/HR: 50 INJECTION, SOLUTION INTRAVENOUS at 21:09

## 2021-03-27 RX ADMIN — DEXTROSE AND SODIUM CHLORIDE PRN MLS/HR: 5; 450 INJECTION, SOLUTION INTRAVENOUS at 15:30

## 2021-03-27 RX ADMIN — Medication SCH MG: at 19:46

## 2021-03-27 RX ADMIN — Medication SCH ML: at 08:27

## 2021-03-27 NOTE — NUR
RN MS closing notes

Pt is resting in bed comfortably. Pt is alert and orientedX1. Respiration on 3 L NC. No SOB. 
No S/S of distress noted. VS is stable. Afebrile. Routine meds were given as ordered. IV 
site at R hand#22 is clean,intact and infusing well D5 1/2 NS@ 70 ml/hr. IV site at R 
forearm # 22 is clean, intact and SL. Kept Pt clean, dry and comfortable. All needs met and 
attended. Safety precautions is maintained. Bed at low position, brakes locked, side rails 
upX2, hob elevated and call light is within reach. Will endorse to morning nurse for LILLIE.

## 2021-03-27 NOTE — NUR
MS RN CLOSING NOTES



PATIENT REMAINS IN BED, ASLEEP. DURING THE DAY AWAKE, A/O X2. PATIENT ON OXYGEN THERAPY AT 3 
LPM VIA NASAL CANULA; BREATHING EVEN AND UNLABORED, NO SOB NOTED DURING THE DAY. NO 
COMPLAINS OF PAIN DURING THE DAY. IV ACCESS AT RFA G # 22; SL AND R HAND G # 22 INFUSING D5 
1/2 NS @70 MLS/HR. HOB ELEVATED AT 35%. ALL NEEDS ATTENDED THROUGHOUT THE DAY. SAFETY 
PRECAUTIONS IN PLACE; BED IN LOCKED POSITION AND LOCKED, RAILS UP X2, CALL LIGHT WITHIN 
REACH. WILL ENDORSE TO NIGHT SHIFT NURSE.

## 2021-03-27 NOTE — NUR
MS RN NOTES - HEPARIN



NOTIFIED DR. CUMMINGS PATIENT'S PLT - 86, HGB - 9.8, HCT - 31. ORDERS TO HOLD HEPARIN 5000 UNITS 
NOW; NEW ORDERS HEPARIN 5000UNITS Q12H. ORDERS CARRIED OUT.

## 2021-03-27 NOTE — NUR
MS RN OPENING NOTES



PATIENT RESTING IN BED A/OX1. ON O2 3LPM VIA N/C; TOLERATING WELL WITH NO SOB. NO S/S OF 
PAIN OR DISCOMFORT AT THIS TIME. RFA G#22 S/L PATENT AND INTACT. R HAND G#22 D5  1/2NS @ 70 
ML/HR; PATENT AND INTACT. SAFETY PRECAUTIONS IN PLACE: BED IN LOWEST LOCKED POSITION; SIDE 
RAILS UPX2, CALLLIGHT WITHIN EASY REACH, BED ALARMS ON. PATIENT MEDICALLY STABLE AND WILL 
CONTINUE PLAN OF CARE.

## 2021-03-27 NOTE — NUR
MS RN OPENING NOTES



RECEIVED PATIENT IN BED, ASLEEP. PATIENT ON OXYGEN THERAPY AT 3 LPM VIA NASAL CANULA; 
BREATHING EVEN AND UNLABORED, NO SOB NOTED AT THIS TIME. NO S/S OF PAIN SUCH AS FACIAL 
GRIMACING, MOANING OR GUARDING. IV ACCESS AT RFA G # 22; SL AND R HAND G # 22 INFUSING D5 
1/2 NS @70 MLS/HR. HOB ELEVATED AT 35%. SAFETY PRECAUTIONS IN PLACE; BED IN LOCKED POSITION 
AND LOCKED, RAILS UP X2, CALL LIGHT WITHIN REACH. WILL CONTINUE TO MONITOR PATIENT.

## 2021-03-27 NOTE — NUR
RN notes

Pt's sister Radha called and asked about Pt's condition. Pt is stable and able to make 
needs known. No SOB. No S/S of distress noted. Pt's sister verbalize understanding and 
appreciate the info. Will continue to monitor.

## 2021-03-28 VITALS — DIASTOLIC BLOOD PRESSURE: 79 MMHG | SYSTOLIC BLOOD PRESSURE: 116 MMHG

## 2021-03-28 VITALS — SYSTOLIC BLOOD PRESSURE: 105 MMHG | DIASTOLIC BLOOD PRESSURE: 74 MMHG

## 2021-03-28 VITALS — DIASTOLIC BLOOD PRESSURE: 59 MMHG | SYSTOLIC BLOOD PRESSURE: 104 MMHG

## 2021-03-28 LAB
BASOPHILS # BLD AUTO: 0 /CMM (ref 0–0.2)
BASOPHILS NFR BLD AUTO: 0 % (ref 0–2)
BUN SERPL-MCNC: 17 MG/DL (ref 7–18)
CALCIUM SERPL-MCNC: 6.9 MG/DL (ref 8.5–10.1)
CHLORIDE SERPL-SCNC: 109 MMOL/L (ref 98–107)
CO2 SERPL-SCNC: 26 MMOL/L (ref 21–32)
CREAT SERPL-MCNC: 0.7 MG/DL (ref 0.6–1.3)
EOSINOPHIL NFR BLD AUTO: 11.6 % (ref 0–6)
GLUCOSE SERPL-MCNC: 100 MG/DL (ref 74–106)
HCT VFR BLD AUTO: 32 % (ref 33–45)
HGB BLD-MCNC: 10 G/DL (ref 11.5–14.8)
LYMPHOCYTES NFR BLD AUTO: 0.7 /CMM (ref 0.8–4.8)
LYMPHOCYTES NFR BLD AUTO: 5.3 % (ref 20–44)
MAGNESIUM SERPL-MCNC: 2.3 MG/DL (ref 1.8–2.4)
MCHC RBC AUTO-ENTMCNC: 31 G/DL (ref 31–36)
MCV RBC AUTO: 93 FL (ref 82–100)
MONOCYTES NFR BLD AUTO: 0.8 /CMM (ref 0.1–1.3)
MONOCYTES NFR BLD AUTO: 6.8 % (ref 2–12)
NEUTROPHILS # BLD AUTO: 9.5 /CMM (ref 1.8–8.9)
NEUTROPHILS NFR BLD AUTO: 76.3 % (ref 43–81)
PLATELET # BLD AUTO: 105 /CMM (ref 150–450)
POTASSIUM SERPL-SCNC: 3.9 MMOL/L (ref 3.5–5.1)
RBC # BLD AUTO: 3.42 MIL/UL (ref 4–5.2)
SODIUM SERPL-SCNC: 142 MMOL/L (ref 136–145)
WBC NRBC COR # BLD AUTO: 12.4 K/UL (ref 4.3–11)

## 2021-03-28 RX ADMIN — Medication SCH APPLIC: at 09:07

## 2021-03-28 RX ADMIN — DEXTROSE AND SODIUM CHLORIDE PRN MLS/HR: 5; 450 INJECTION, SOLUTION INTRAVENOUS at 07:19

## 2021-03-28 RX ADMIN — ALBUTEROL SULFATE SCH MG: 2.5 SOLUTION RESPIRATORY (INHALATION) at 20:09

## 2021-03-28 RX ADMIN — Medication SCH ML: at 09:08

## 2021-03-28 RX ADMIN — DEXTROSE MONOHYDRATE SCH MLS/HR: 50 INJECTION, SOLUTION INTRAVENOUS at 22:17

## 2021-03-28 RX ADMIN — DEXTROSE MONOHYDRATE SCH MLS/HR: 50 INJECTION, SOLUTION INTRAVENOUS at 04:17

## 2021-03-28 RX ADMIN — Medication SCH MG: at 11:19

## 2021-03-28 RX ADMIN — HEPARIN SODIUM SCH UNITS: 5000 INJECTION INTRAVENOUS; SUBCUTANEOUS at 21:11

## 2021-03-28 RX ADMIN — Medication SCH MG: at 07:41

## 2021-03-28 RX ADMIN — METOPROLOL TARTRATE SCH MG: 50 TABLET, FILM COATED ORAL at 12:00

## 2021-03-28 RX ADMIN — METOPROLOL TARTRATE SCH MG: 50 TABLET, FILM COATED ORAL at 18:00

## 2021-03-28 RX ADMIN — ALBUTEROL SULFATE SCH MG: 2.5 SOLUTION RESPIRATORY (INHALATION) at 11:19

## 2021-03-28 RX ADMIN — DEXTROSE MONOHYDRATE SCH MLS/HR: 50 INJECTION, SOLUTION INTRAVENOUS at 21:10

## 2021-03-28 RX ADMIN — ALBUTEROL SULFATE SCH MG: 2.5 SOLUTION RESPIRATORY (INHALATION) at 07:42

## 2021-03-28 RX ADMIN — Medication SCH APPLIC: at 16:30

## 2021-03-28 RX ADMIN — SODIUM CHLORIDE SCH MG: 9 INJECTION, SOLUTION INTRAVENOUS at 09:04

## 2021-03-28 RX ADMIN — Medication SCH ML: at 12:01

## 2021-03-28 RX ADMIN — ALBUTEROL SULFATE SCH MG: 2.5 SOLUTION RESPIRATORY (INHALATION) at 15:21

## 2021-03-28 RX ADMIN — Medication SCH ML: at 16:30

## 2021-03-28 RX ADMIN — ASPIRIN 81 MG SCH MG: 81 TABLET ORAL at 09:05

## 2021-03-28 RX ADMIN — SODIUM CHLORIDE SCH MG: 9 INJECTION, SOLUTION INTRAVENOUS at 16:30

## 2021-03-28 RX ADMIN — METOPROLOL TARTRATE SCH MG: 50 TABLET, FILM COATED ORAL at 05:37

## 2021-03-28 RX ADMIN — Medication SCH MG: at 15:21

## 2021-03-28 RX ADMIN — Medication SCH MG: at 20:09

## 2021-03-28 RX ADMIN — LORATADINE SCH MG: 10 TABLET ORAL at 09:05

## 2021-03-28 NOTE — NUR
RN OPENING NOTE



PT IS RESTING IN BED ASLEEP. AROUSABLE TO LIGHT TOUCH. A/O X2 AND ABLE TO COMMUNICATE NEEDS 
TO NURSES. NO COMPLAINT OF PAIN OR NAUSEA. CURRENTLY ON 3L O2 DELIVERED VIA NC. ON BEDREST 
AND INCONTINENT. REDNESS PRESENT IN SCROTAL AREA. ON PUREE DIET. HL PRESENT ON R FA 22G AND 
R HAND 22G. SAFETY MEASURES IN PLACE. SIDE RAILS RAISED. BED LOWERED. CALL LIGHT WITHIN 
REACH. WILL CONTINUE TO MONITOR.

## 2021-03-28 NOTE — NUR
MS RN OPENING NOTES



PATIENT RESTING IN BED A/OX1. ON O2 3LPM VIA N/C; TOLERATING WELL WITH NO SOB. NO S/S OF 
PAIN OR DISCOMFORT AT THIS TIME. RFA G#22 S/L PATENT AND INTACT. R HAND G#22 D5  1/2NS @ 70 
ML/HR; PATENT AND INTACT. SAFETY PRECAUTIONS IN PLACE: BED IN LOWEST LOCKED POSITION; SIDE 
RAILS UPX2, CALL LIGHT WITHIN EASY REACH, BED ALARMS ON. PATIENT MEDICALLY STABLE AND WILL 
CONTINUE PLAN OF CARE.

## 2021-03-28 NOTE — NUR
MS RN CLOSING NOTES



PATIENT RESTING IN BED A/OX1. ON O2 3LPM VIA N/C; TOLERATING WELL WITH NO SOB. NO S/S OF 
PAIN OR DISCOMFORT AT THIS TIME. RFA G#22 S/L PATENT AND INTACT. R HAND G#22 D5  1/2NS @ 70 
ML/HR; PATENT AND INTACT. SAFETY PRECAUTIONS IN PLACE: BED IN LOWEST LOCKED POSITION; SIDE 
RAILS UPX2, CALLLIGHT WITHIN EASY REACH, BED ALARMS ON. PATIENT MEDICALLY STABLE AND WILL 
ENDORSE PLAN OF CARE TO ONCOMING MORNING RN.

## 2021-03-28 NOTE — NUR
RN CLOSING NOTE



PT IS RESTING IN BED AWAKE. A/O X2 AND ABLE TO COMMUNICATE SOME NEEDS TO NURSES. ENGLISH 
SPEAKING. NO COMPLAINT OF PAIN OR NAUSEA. CURRENTLY ON 3L O2 DELIVERED VIA NC. ON BEDREST 
AND INCONTINENT. GENERALIZED REDNESS PRESENT. ON PUREE DIET. HL PRESENT ON R FA 22G AND R 
HAND 22G. ROUTINE MEDS GIVEN. SAFETY MEASURES IN PLACE. SIDE RAILS RAISED. BED LOWERED. CALL 
LIGHT WITHIN REACH. REPORT TO BE GIVEN TO NIGHT NURSE FOR LILLIE.

## 2021-03-29 VITALS — DIASTOLIC BLOOD PRESSURE: 67 MMHG | SYSTOLIC BLOOD PRESSURE: 110 MMHG

## 2021-03-29 VITALS — SYSTOLIC BLOOD PRESSURE: 117 MMHG | DIASTOLIC BLOOD PRESSURE: 68 MMHG

## 2021-03-29 VITALS — DIASTOLIC BLOOD PRESSURE: 68 MMHG | SYSTOLIC BLOOD PRESSURE: 113 MMHG

## 2021-03-29 LAB
BASOPHILS # BLD AUTO: 0 /CMM (ref 0–0.2)
BASOPHILS NFR BLD AUTO: 0.1 % (ref 0–2)
BUN SERPL-MCNC: 15 MG/DL (ref 7–18)
CALCIUM SERPL-MCNC: 7.5 MG/DL (ref 8.5–10.1)
CHLORIDE SERPL-SCNC: 111 MMOL/L (ref 98–107)
CO2 SERPL-SCNC: 26 MMOL/L (ref 21–32)
CREAT SERPL-MCNC: 0.7 MG/DL (ref 0.6–1.3)
EOSINOPHIL NFR BLD AUTO: 13.8 % (ref 0–6)
EOSINOPHIL NFR BLD MANUAL: 14 % (ref 0–4)
GLUCOSE SERPL-MCNC: 78 MG/DL (ref 74–106)
HCT VFR BLD AUTO: 33 % (ref 33–45)
HGB BLD-MCNC: 10.4 G/DL (ref 11.5–14.8)
LYMPHOCYTES NFR BLD AUTO: 0.7 /CMM (ref 0.8–4.8)
LYMPHOCYTES NFR BLD AUTO: 5.8 % (ref 20–44)
LYMPHOCYTES NFR BLD MANUAL: 3 % (ref 16–48)
MAGNESIUM SERPL-MCNC: 2.2 MG/DL (ref 1.8–2.4)
MCHC RBC AUTO-ENTMCNC: 31 G/DL (ref 31–36)
MCV RBC AUTO: 92 FL (ref 82–100)
MONOCYTES NFR BLD AUTO: 1 /CMM (ref 0.1–1.3)
MONOCYTES NFR BLD AUTO: 8.5 % (ref 2–12)
MONOCYTES NFR BLD MANUAL: 5 % (ref 0–11)
NEUTROPHILS # BLD AUTO: 8.2 /CMM (ref 1.8–8.9)
NEUTROPHILS NFR BLD AUTO: 71.8 % (ref 43–81)
NEUTS SEG NFR BLD MANUAL: 78 % (ref 42–76)
PLATELET # BLD AUTO: 96 /CMM (ref 150–450)
POTASSIUM SERPL-SCNC: 3.9 MMOL/L (ref 3.5–5.1)
RBC # BLD AUTO: 3.61 MIL/UL (ref 4–5.2)
SODIUM SERPL-SCNC: 144 MMOL/L (ref 136–145)
WBC NRBC COR # BLD AUTO: 11.5 K/UL (ref 4.3–11)

## 2021-03-29 RX ADMIN — METOPROLOL TARTRATE SCH MG: 50 TABLET, FILM COATED ORAL at 18:00

## 2021-03-29 RX ADMIN — ALBUTEROL SULFATE SCH MG: 2.5 SOLUTION RESPIRATORY (INHALATION) at 08:18

## 2021-03-29 RX ADMIN — ALBUTEROL SULFATE SCH MG: 2.5 SOLUTION RESPIRATORY (INHALATION) at 20:26

## 2021-03-29 RX ADMIN — Medication SCH ML: at 09:27

## 2021-03-29 RX ADMIN — DEXTROSE AND SODIUM CHLORIDE PRN MLS/HR: 5; 450 INJECTION, SOLUTION INTRAVENOUS at 19:23

## 2021-03-29 RX ADMIN — HEPARIN SODIUM SCH UNITS: 5000 INJECTION INTRAVENOUS; SUBCUTANEOUS at 13:03

## 2021-03-29 RX ADMIN — Medication SCH MG: at 11:30

## 2021-03-29 RX ADMIN — Medication SCH ML: at 13:03

## 2021-03-29 RX ADMIN — Medication SCH MG: at 20:26

## 2021-03-29 RX ADMIN — ASPIRIN 81 MG SCH MG: 81 TABLET ORAL at 09:23

## 2021-03-29 RX ADMIN — DEXTROSE MONOHYDRATE SCH MLS/HR: 50 INJECTION, SOLUTION INTRAVENOUS at 22:31

## 2021-03-29 RX ADMIN — DEXTROSE AND SODIUM CHLORIDE PRN MLS/HR: 5; 450 INJECTION, SOLUTION INTRAVENOUS at 05:11

## 2021-03-29 RX ADMIN — ALBUTEROL SULFATE SCH MG: 2.5 SOLUTION RESPIRATORY (INHALATION) at 11:31

## 2021-03-29 RX ADMIN — ALBUTEROL SULFATE SCH MG: 2.5 SOLUTION RESPIRATORY (INHALATION) at 15:43

## 2021-03-29 RX ADMIN — DEXTROSE MONOHYDRATE SCH MLS/HR: 50 INJECTION, SOLUTION INTRAVENOUS at 18:05

## 2021-03-29 RX ADMIN — Medication SCH APPLIC: at 09:27

## 2021-03-29 RX ADMIN — METOPROLOL TARTRATE SCH MG: 50 TABLET, FILM COATED ORAL at 05:19

## 2021-03-29 RX ADMIN — METOPROLOL TARTRATE SCH MG: 50 TABLET, FILM COATED ORAL at 00:44

## 2021-03-29 RX ADMIN — METOPROLOL TARTRATE SCH MG: 50 TABLET, FILM COATED ORAL at 12:45

## 2021-03-29 RX ADMIN — Medication SCH APPLIC: at 16:23

## 2021-03-29 RX ADMIN — METOPROLOL TARTRATE SCH MG: 50 TABLET, FILM COATED ORAL at 22:57

## 2021-03-29 RX ADMIN — HEPARIN SODIUM SCH UNITS: 5000 INJECTION INTRAVENOUS; SUBCUTANEOUS at 05:19

## 2021-03-29 RX ADMIN — Medication SCH MG: at 08:17

## 2021-03-29 RX ADMIN — Medication SCH ML: at 16:22

## 2021-03-29 RX ADMIN — LORATADINE SCH MG: 10 TABLET ORAL at 09:22

## 2021-03-29 RX ADMIN — HEPARIN SODIUM SCH UNITS: 5000 INJECTION INTRAVENOUS; SUBCUTANEOUS at 22:29

## 2021-03-29 RX ADMIN — SODIUM CHLORIDE SCH MG: 9 INJECTION, SOLUTION INTRAVENOUS at 09:26

## 2021-03-29 RX ADMIN — Medication SCH MG: at 15:43

## 2021-03-29 RX ADMIN — DEXTROSE MONOHYDRATE SCH MLS/HR: 50 INJECTION, SOLUTION INTRAVENOUS at 16:22

## 2021-03-29 NOTE — NUR
SHARONDA ([phone]), PT'S SISTER REQUESTED TO SPEAK WITH HOSPITALIST. LANRE TAYLOR MADE 
AWARE. WILL CONTINUE WILL PLAN OF CARE

## 2021-03-29 NOTE — NUR
MS RN OPENING NOTES



RECEIVED PT AWAKE IN BED AT THIS TIME. A/O X1-2, NO SOB NOTED, BREATHING EVEN AND UNLABORED. 
PT NOTES 3LPM O2 VIA NC SATURATING AT 98%. NO C/O PAIN AT THIS TIME. NO S/O ANY APPARENT 
DISTRESS NOTED. IV ACCESS NOTED IN RFA G#22 AND RIGHT HAND G#22, BOTH INTACT PATENT AND 
FLUSHING WELL. ASPIRATION AND SAFETY PRECAUTIONS IN PLACED AND MAINTAINED AT ALL TIMES. BED 
IN LOWEST LOCKED POSITION, HOB ELEVATED, SIDE RAILS UPX2, CALL LIGHT AND TABLE WITHIN REACH. 
WILL CONTINUE TO MONITOR

## 2021-03-29 NOTE — NUR
RN CLOSING NOTES



PT AWAKE IN BED AT THIS TIME. PT REMAINED STABLE THROUGHOUT SHIFT. ALL CARE, NEEDS, 
MEDICATIONS AND TREATMENT ADMINISTERED AS ANTICIPATED PER ORDER. PT KEPT CLEAN AND DRY. 
ZGUARD AND MEPILEX APPLIED TO SACRAL. PT REPOSITIONED Q2HR AND PRN. SAFETY AND ASPIRATION 
PRECAUTIONS MAINTAINED AT ALL TIMES. BED IN LOWEST LOCKED POSITION, HOB ELEVATED, SIDE RAILS 
UPX2, CALL LIGHT AND TABLE WITHIN REACH. WILL ENDORSE TO NIGHT SHIFT NURSE FOR LILLIE

## 2021-03-29 NOTE — NUR
RECEIVED CRITICAL LAB VALUE FOR CK-MB 30.3. LAB VALUE REPORTED BY JOHANA FROM [facility]. 
READBACK PROVIDED. RENA, CHARGE NURSE AND DR. COURTNEY MADE AWARE. AWAITING ORDERS. WILL 
CONTINUE TO MONITOR.

## 2021-03-29 NOTE — NUR
MS RN CLOSING NOTES



PATIENT RESTING IN BED A/OX2. ON O2 3LPM VIA N/C; TOLERATING WELL WITH NO SOB. NO S/S OF 
PAIN OR DISCOMFORT AT THIS TIME. RFA G#22 S/L PATENT AND INTACT. R HAND G#22 D5  1/2NS @ 70 
ML/HR; PATENT AND INTACT. SAFETY PRECAUTIONS IN PLACE: BED IN LOWEST LOCKED POSITION; SIDE 
RAILS UPX2, CALL LIGHT WITHIN EASY REACH, BED ALARMS ON. PATIENT MEDICALLY STABLE AND WILL 
ENDORSE PLAN OF CARE TO ONCOMING MORNING RN.

## 2021-03-30 VITALS — SYSTOLIC BLOOD PRESSURE: 121 MMHG | DIASTOLIC BLOOD PRESSURE: 66 MMHG

## 2021-03-30 VITALS — SYSTOLIC BLOOD PRESSURE: 103 MMHG | DIASTOLIC BLOOD PRESSURE: 57 MMHG

## 2021-03-30 VITALS — SYSTOLIC BLOOD PRESSURE: 132 MMHG | DIASTOLIC BLOOD PRESSURE: 74 MMHG

## 2021-03-30 LAB
BASOPHILS # BLD AUTO: 0.1 /CMM (ref 0–0.2)
BASOPHILS NFR BLD AUTO: 0.5 % (ref 0–2)
BUN SERPL-MCNC: 15 MG/DL (ref 7–18)
CALCIUM SERPL-MCNC: 7.3 MG/DL (ref 8.5–10.1)
CHLORIDE SERPL-SCNC: 109 MMOL/L (ref 98–107)
CO2 SERPL-SCNC: 27 MMOL/L (ref 21–32)
CREAT SERPL-MCNC: 0.8 MG/DL (ref 0.6–1.3)
EOSINOPHIL NFR BLD AUTO: 16.8 % (ref 0–6)
GLUCOSE SERPL-MCNC: 98 MG/DL (ref 74–106)
HCT VFR BLD AUTO: 34 % (ref 33–45)
HGB BLD-MCNC: 10.5 G/DL (ref 11.5–14.8)
LYMPHOCYTES NFR BLD AUTO: 1.2 /CMM (ref 0.8–4.8)
LYMPHOCYTES NFR BLD AUTO: 6.8 % (ref 20–44)
MAGNESIUM SERPL-MCNC: 2.1 MG/DL (ref 1.8–2.4)
MCHC RBC AUTO-ENTMCNC: 31 G/DL (ref 31–36)
MCV RBC AUTO: 92 FL (ref 82–100)
MONOCYTES NFR BLD AUTO: 1.2 /CMM (ref 0.1–1.3)
MONOCYTES NFR BLD AUTO: 7.2 % (ref 2–12)
NEUTROPHILS # BLD AUTO: 11.9 /CMM (ref 1.8–8.9)
NEUTROPHILS NFR BLD AUTO: 68.7 % (ref 43–81)
PLATELET # BLD AUTO: 122 /CMM (ref 150–450)
POTASSIUM SERPL-SCNC: 3.8 MMOL/L (ref 3.5–5.1)
RBC # BLD AUTO: 3.66 MIL/UL (ref 4–5.2)
SODIUM SERPL-SCNC: 143 MMOL/L (ref 136–145)
WBC NRBC COR # BLD AUTO: 17.2 K/UL (ref 4.3–11)

## 2021-03-30 RX ADMIN — METOPROLOL TARTRATE SCH MG: 50 TABLET, FILM COATED ORAL at 05:03

## 2021-03-30 RX ADMIN — ASPIRIN 81 MG SCH MG: 81 TABLET ORAL at 08:45

## 2021-03-30 RX ADMIN — HEPARIN SODIUM SCH UNITS: 5000 INJECTION INTRAVENOUS; SUBCUTANEOUS at 05:03

## 2021-03-30 RX ADMIN — DEXTROSE MONOHYDRATE SCH MLS/HR: 50 INJECTION, SOLUTION INTRAVENOUS at 09:00

## 2021-03-30 RX ADMIN — Medication SCH APPLIC: at 16:53

## 2021-03-30 RX ADMIN — METOPROLOL TARTRATE SCH MG: 50 TABLET, FILM COATED ORAL at 17:34

## 2021-03-30 RX ADMIN — HEPARIN SODIUM SCH UNITS: 5000 INJECTION INTRAVENOUS; SUBCUTANEOUS at 13:07

## 2021-03-30 RX ADMIN — ALBUTEROL SULFATE SCH MG: 2.5 SOLUTION RESPIRATORY (INHALATION) at 14:30

## 2021-03-30 RX ADMIN — Medication SCH ML: at 08:46

## 2021-03-30 RX ADMIN — Medication SCH MG: at 11:19

## 2021-03-30 RX ADMIN — ALBUTEROL SULFATE SCH MG: 2.5 SOLUTION RESPIRATORY (INHALATION) at 11:19

## 2021-03-30 RX ADMIN — Medication SCH MG: at 14:30

## 2021-03-30 RX ADMIN — Medication SCH MG: at 20:12

## 2021-03-30 RX ADMIN — Medication SCH MG: at 07:50

## 2021-03-30 RX ADMIN — LORATADINE SCH MG: 10 TABLET ORAL at 08:45

## 2021-03-30 RX ADMIN — Medication SCH ML: at 13:13

## 2021-03-30 RX ADMIN — Medication SCH APPLIC: at 08:56

## 2021-03-30 RX ADMIN — DEXTROSE AND SODIUM CHLORIDE PRN MLS/HR: 5; 450 INJECTION, SOLUTION INTRAVENOUS at 10:40

## 2021-03-30 RX ADMIN — Medication SCH ML: at 16:54

## 2021-03-30 RX ADMIN — METOPROLOL TARTRATE SCH MG: 50 TABLET, FILM COATED ORAL at 23:50

## 2021-03-30 RX ADMIN — METOPROLOL TARTRATE SCH MG: 50 TABLET, FILM COATED ORAL at 12:08

## 2021-03-30 RX ADMIN — ALBUTEROL SULFATE SCH MG: 2.5 SOLUTION RESPIRATORY (INHALATION) at 20:12

## 2021-03-30 RX ADMIN — ALBUTEROL SULFATE SCH MG: 2.5 SOLUTION RESPIRATORY (INHALATION) at 07:50

## 2021-03-30 RX ADMIN — SODIUM CHLORIDE SCH MG: 9 INJECTION, SOLUTION INTRAVENOUS at 08:45

## 2021-03-30 NOTE — NUR
MS RN OPENING NOTES



RECEIVED PT AWAKE IN BED AT THIS TIME. A/O X1-2, NO SOB NOTED, RESPIRATIONS EVEN AND 
UNLABORED. PT ON 8L VIA NRB SATURATING AT 98%. NO C/O PAIN AT THIS TIME. NO SOB NOTED. NO 
S/S OF RESPIRATORY DISTRESS NOTED. IV ACCESS NOTED IN RFA #22G AND RIGHT HAND #22G, BOTH 
INTACT, PATENT, AND FLUSHING WELL. ASPIRATION AND SAFETY PRECAUTIONS IN PLACE AND MAINTAINED 
AT ALL TIMES. BED IN LOWEST LOCKED POSITION, HOB ELEVATED, SIDE RAILS UP X2, CALL LIGHT AND 
TABLE WITHIN REACH. WILL CONTINUE TO MONITOR.


-------------------------------------------------------------------------------

Addendum: 03/30/21 at 1435 by MARIFER DILL RN

-------------------------------------------------------------------------------

MS RN OPENING NOTES



RECEIVED PT AWAKE IN BED AT THIS TIME. A/O X1-2, NO SOB NOTED, RESPIRATIONS EVEN AND 
UNLABORED. PT ON 2L VIA NC SATURATING AT 98%. NO C/O PAIN AT THIS TIME. NO SOB NOTED. NO S/S 
OF RESPIRATORY DISTRESS NOTED. IV ACCESS NOTED IN RFA #22G AND RIGHT HAND #22G, BOTH INTACT, 
PATENT, AND FLUSHING WELL. ASPIRATION AND SAFETY PRECAUTIONS IN PLACE AND MAINTAINED AT ALL 
TIMES. BED IN LOWEST LOCKED POSITION, HOB ELEVATED, SIDE RAILS UP X2, CALL LIGHT AND TABLE 
WITHIN REACH. WILL CONTINUE TO MONITOR.

## 2021-03-30 NOTE — NUR
MS RN CLOSING NOTES



PT AWAKE AND RESTING COMFORTABLY IN BED AT THIS TIME. PT REMAINED STABLE THROUGHOUT SHIFT. 
ALL CARE, NEEDS, MEDICATIONS AND TREATMENT ADMINISTERED PER ORDER. PT KEPT CLEAN AND DRY. 
ZGUARD AND MEPILEX APPLIED TO SACRAL. PT REPOSITIONED Q2HR AND PRN. SAFETY AND ASPIRATION 
PRECAUTIONS MAINTAINED AT ALL TIMES. BED IN LOWEST LOCKED POSITION, HOB ELEVATED, SIDE RAILS 
UP X2, CALL LIGHT AND TABLE WITHIN REACH. WILL ENDORSE TO NIGHT SHIFT NURSE FOR CONTINUITY 
OF CARE.

## 2021-03-30 NOTE — NUR
RECEIVED IN BED ALERT TO ACTIVITY AROUND HERE .  WILL SMILE WHEN SPOKEN TO. NOTED SHE IS 
CONFUSED...TAKING OFF HER NASAL CANULA AND PUTTING IT IN HER MOUTH.  NOT SPEAKING   CALLED 
LAB TO FIND OUT THE LATEST PTT AND THE TEST IS PENDING, PT TO START HEPARIN GTT

## 2021-03-31 VITALS — SYSTOLIC BLOOD PRESSURE: 135 MMHG | DIASTOLIC BLOOD PRESSURE: 71 MMHG

## 2021-03-31 VITALS — DIASTOLIC BLOOD PRESSURE: 69 MMHG | SYSTOLIC BLOOD PRESSURE: 114 MMHG

## 2021-03-31 VITALS — SYSTOLIC BLOOD PRESSURE: 121 MMHG | DIASTOLIC BLOOD PRESSURE: 66 MMHG

## 2021-03-31 VITALS — DIASTOLIC BLOOD PRESSURE: 61 MMHG | SYSTOLIC BLOOD PRESSURE: 107 MMHG

## 2021-03-31 VITALS — SYSTOLIC BLOOD PRESSURE: 128 MMHG | DIASTOLIC BLOOD PRESSURE: 76 MMHG

## 2021-03-31 LAB
ALBUMIN SERPL BCP-MCNC: 1.9 G/DL (ref 3.4–5)
ALP SERPL-CCNC: 338 U/L (ref 46–116)
ALT SERPL W P-5'-P-CCNC: 49 U/L (ref 12–78)
AST SERPL W P-5'-P-CCNC: 41 U/L (ref 15–37)
BASOPHILS # BLD AUTO: 0 /CMM (ref 0–0.2)
BASOPHILS NFR BLD AUTO: 0.2 % (ref 0–2)
BILIRUB SERPL-MCNC: 0.3 MG/DL (ref 0.2–1)
BUN SERPL-MCNC: 16 MG/DL (ref 7–18)
CALCIUM SERPL-MCNC: 7.2 MG/DL (ref 8.5–10.1)
CHLORIDE SERPL-SCNC: 110 MMOL/L (ref 98–107)
CO2 SERPL-SCNC: 28 MMOL/L (ref 21–32)
CREAT SERPL-MCNC: 0.8 MG/DL (ref 0.6–1.3)
EOSINOPHIL NFR BLD AUTO: 6.5 % (ref 0–6)
GLUCOSE SERPL-MCNC: 104 MG/DL (ref 74–106)
HCT VFR BLD AUTO: 30 % (ref 33–45)
HGB BLD-MCNC: 9.6 G/DL (ref 11.5–14.8)
LYMPHOCYTES NFR BLD AUTO: 0.7 /CMM (ref 0.8–4.8)
LYMPHOCYTES NFR BLD AUTO: 6 % (ref 20–44)
MCHC RBC AUTO-ENTMCNC: 32 G/DL (ref 31–36)
MCV RBC AUTO: 91 FL (ref 82–100)
MONOCYTES NFR BLD AUTO: 0.9 /CMM (ref 0.1–1.3)
MONOCYTES NFR BLD AUTO: 8.3 % (ref 2–12)
NEUTROPHILS # BLD AUTO: 8.6 /CMM (ref 1.8–8.9)
NEUTROPHILS NFR BLD AUTO: 79 % (ref 43–81)
PLATELET # BLD AUTO: 102 /CMM (ref 150–450)
POTASSIUM SERPL-SCNC: 4.1 MMOL/L (ref 3.5–5.1)
PROT SERPL-MCNC: 5.3 G/DL (ref 6.4–8.2)
RBC # BLD AUTO: 3.33 MIL/UL (ref 4–5.2)
SODIUM SERPL-SCNC: 144 MMOL/L (ref 136–145)
WBC NRBC COR # BLD AUTO: 10.9 K/UL (ref 4.3–11)

## 2021-03-31 RX ADMIN — ASPIRIN 81 MG SCH MG: 81 TABLET ORAL at 08:51

## 2021-03-31 RX ADMIN — Medication SCH APPLIC: at 17:00

## 2021-03-31 RX ADMIN — Medication SCH ML: at 08:52

## 2021-03-31 RX ADMIN — DEXTROSE AND SODIUM CHLORIDE PRN MLS/HR: 5; 450 INJECTION, SOLUTION INTRAVENOUS at 19:57

## 2021-03-31 RX ADMIN — Medication SCH MG: at 14:32

## 2021-03-31 RX ADMIN — METOPROLOL TARTRATE SCH MG: 50 TABLET, FILM COATED ORAL at 06:05

## 2021-03-31 RX ADMIN — Medication SCH ML: at 17:00

## 2021-03-31 RX ADMIN — ALBUTEROL SULFATE SCH MG: 2.5 SOLUTION RESPIRATORY (INHALATION) at 07:52

## 2021-03-31 RX ADMIN — Medication SCH APPLIC: at 08:53

## 2021-03-31 RX ADMIN — Medication SCH MG: at 10:34

## 2021-03-31 RX ADMIN — ALBUTEROL SULFATE SCH MG: 2.5 SOLUTION RESPIRATORY (INHALATION) at 19:04

## 2021-03-31 RX ADMIN — Medication SCH ML: at 13:06

## 2021-03-31 RX ADMIN — ALBUTEROL SULFATE SCH MG: 2.5 SOLUTION RESPIRATORY (INHALATION) at 14:32

## 2021-03-31 RX ADMIN — Medication SCH MG: at 19:04

## 2021-03-31 RX ADMIN — Medication SCH MG: at 07:51

## 2021-03-31 RX ADMIN — METOPROLOL TARTRATE SCH MG: 50 TABLET, FILM COATED ORAL at 05:58

## 2021-03-31 RX ADMIN — LORATADINE SCH MG: 10 TABLET ORAL at 08:52

## 2021-03-31 RX ADMIN — SODIUM CHLORIDE SCH MG: 9 INJECTION, SOLUTION INTRAVENOUS at 08:52

## 2021-03-31 RX ADMIN — METOPROLOL TARTRATE SCH MG: 50 TABLET, FILM COATED ORAL at 17:02

## 2021-03-31 RX ADMIN — DEXTROSE AND SODIUM CHLORIDE PRN MLS/HR: 5; 450 INJECTION, SOLUTION INTRAVENOUS at 01:37

## 2021-03-31 RX ADMIN — ALBUTEROL SULFATE SCH MG: 2.5 SOLUTION RESPIRATORY (INHALATION) at 10:34

## 2021-03-31 RX ADMIN — METOPROLOL TARTRATE SCH MG: 50 TABLET, FILM COATED ORAL at 12:20

## 2021-03-31 NOTE — NUR
LAB CALLED AT 02:30 AND REMINDED I HAVE A PTT LAB DRAW D/T PATIENT ON HEPARIN GTT 

LAB HERE ABOUT 0:15 TO DRAW THE ORDERED PTT.  LEFT ARM BEING USED D/T THE HEPARIN IS 
INFUSING THE RIGHT ARM

NOTED AT 04:20 THE LAB IS HERE AGAIN DRAWING THE BLOOD FOR THE ptt WHEN QUESTIONED "WHY"  HE 
MENTIONED THE PTT WAS HIGH AND A REDRAW WAS IN ORDER...

## 2021-03-31 NOTE — NUR
RN CLOSING NOTES



PT IS RESTING IN BED COMFORTABLY. PT A/OX2. PT ON NASAL CANNULA 3L. NO SOB NOTED AT THE 
MOMENT. NO S/S OF DISTRESS. VITAL SIGNS STABLE. ROUTINE MEDS WERE GIVEN AS ORDERED. IV ON 
RIGHT FOREARM #20 G PATENT AND INTACT INFUSING D5 1/2 NS@ 70 ml/hr. ALL NEEDS MET AND 
ATTENDED. SAFETY PRECAUTIONS IN PLACE: BED IN LOWEST POSITION, BRAKES LOCKED, SIDE RAILS UP 
X2, HOB ELEVATED AND CALL LIGHT WITHIN REACH. WILL ENDORSE TO NIGHT NURSE FOR LILLIE.

## 2021-03-31 NOTE — NUR
MS RN NOTE



PATIENT RECEIVED IN BED, EYES CLOSED, EASILY AROUSABLE. PATIENT A/O X 2. PATIENT CURRENTLY 
ON 3/L OF OXYGEN. NO S/S OF RESPIRATORY DISTRESS, BREATHING EVEN AND UNLABORED. NO C/O 
PAIN/DISCOMFORT. IV SITE PATENT. SAFETY PRECAUTIONS IN PLACE: SIDE RAILS UP, BED IN LOWEST 
POSITION. BED LOCKED, HOB ELEVATED. WILL CONTINUE TO MONITOR.

## 2021-03-31 NOTE — NUR
PATIENT ON LOPRSSOR 25MG PO Q 6 HOURS

LAST NIGHT MIDNIGHT  DOSE GIVEN

THIS 0600 NOTED NO 0600 DOSE TO BE GIVEN IN THE PHARMACY SHEET IN THE COMPUTER STATING NEXT 
DOSE IS NOON

31 0600 DOSE GIVEN WILL EXPLAIN PROBLEM TO THE AM SHIFT TO GIVE THE NOON DOSE

## 2021-04-01 VITALS — SYSTOLIC BLOOD PRESSURE: 114 MMHG | DIASTOLIC BLOOD PRESSURE: 59 MMHG

## 2021-04-01 VITALS — SYSTOLIC BLOOD PRESSURE: 130 MMHG | DIASTOLIC BLOOD PRESSURE: 67 MMHG

## 2021-04-01 VITALS — DIASTOLIC BLOOD PRESSURE: 80 MMHG | SYSTOLIC BLOOD PRESSURE: 130 MMHG

## 2021-04-01 VITALS — SYSTOLIC BLOOD PRESSURE: 127 MMHG | DIASTOLIC BLOOD PRESSURE: 80 MMHG

## 2021-04-01 VITALS — DIASTOLIC BLOOD PRESSURE: 78 MMHG | SYSTOLIC BLOOD PRESSURE: 127 MMHG

## 2021-04-01 LAB
BASOPHILS # BLD AUTO: 0 /CMM (ref 0–0.2)
BASOPHILS NFR BLD AUTO: 0.2 % (ref 0–2)
EOSINOPHIL NFR BLD AUTO: 14.1 % (ref 0–6)
HCT VFR BLD AUTO: 33 % (ref 33–45)
HGB BLD-MCNC: 10.1 G/DL (ref 11.5–14.8)
LYMPHOCYTES NFR BLD AUTO: 1.3 /CMM (ref 0.8–4.8)
LYMPHOCYTES NFR BLD AUTO: 6.1 % (ref 20–44)
MCHC RBC AUTO-ENTMCNC: 31 G/DL (ref 31–36)
MCV RBC AUTO: 93 FL (ref 82–100)
MONOCYTES NFR BLD AUTO: 1.3 /CMM (ref 0.1–1.3)
MONOCYTES NFR BLD AUTO: 6.1 % (ref 2–12)
NEUTROPHILS # BLD AUTO: 15.2 /CMM (ref 1.8–8.9)
NEUTROPHILS NFR BLD AUTO: 73.5 % (ref 43–81)
PLATELET # BLD AUTO: 160 /CMM (ref 150–450)
RBC # BLD AUTO: 3.56 MIL/UL (ref 4–5.2)
WBC NRBC COR # BLD AUTO: 20.7 K/UL (ref 4.3–11)

## 2021-04-01 RX ADMIN — METOPROLOL TARTRATE SCH MG: 50 TABLET, FILM COATED ORAL at 05:43

## 2021-04-01 RX ADMIN — LORATADINE SCH MG: 10 TABLET ORAL at 08:56

## 2021-04-01 RX ADMIN — METOPROLOL TARTRATE SCH MG: 50 TABLET, FILM COATED ORAL at 12:46

## 2021-04-01 RX ADMIN — Medication SCH APPLIC: at 17:45

## 2021-04-01 RX ADMIN — DOXYCYCLINE HYCLATE SCH MG: 100 TABLET, COATED ORAL at 10:57

## 2021-04-01 RX ADMIN — ALBUTEROL SULFATE SCH MG: 2.5 SOLUTION RESPIRATORY (INHALATION) at 15:28

## 2021-04-01 RX ADMIN — ALBUTEROL SULFATE SCH MG: 2.5 SOLUTION RESPIRATORY (INHALATION) at 19:34

## 2021-04-01 RX ADMIN — ASPIRIN 81 MG SCH MG: 81 TABLET ORAL at 08:56

## 2021-04-01 RX ADMIN — Medication SCH MG: at 19:34

## 2021-04-01 RX ADMIN — Medication SCH APPLIC: at 08:56

## 2021-04-01 RX ADMIN — Medication SCH ML: at 08:56

## 2021-04-01 RX ADMIN — ALBUTEROL SULFATE SCH MG: 2.5 SOLUTION RESPIRATORY (INHALATION) at 07:30

## 2021-04-01 RX ADMIN — METOPROLOL TARTRATE SCH MG: 50 TABLET, FILM COATED ORAL at 00:05

## 2021-04-01 RX ADMIN — METOPROLOL TARTRATE SCH MG: 50 TABLET, FILM COATED ORAL at 17:46

## 2021-04-01 RX ADMIN — PANTOPRAZOLE SODIUM SCH MG: 40 TABLET, DELAYED RELEASE ORAL at 08:56

## 2021-04-01 RX ADMIN — ALBUTEROL SULFATE SCH MG: 2.5 SOLUTION RESPIRATORY (INHALATION) at 10:53

## 2021-04-01 RX ADMIN — Medication SCH ML: at 12:47

## 2021-04-01 RX ADMIN — HEPARIN SODIUM PRN MLS/HR: 5000 INJECTION, SOLUTION INTRAVENOUS at 23:00

## 2021-04-01 RX ADMIN — Medication SCH MG: at 07:30

## 2021-04-01 RX ADMIN — DEXTROSE AND SODIUM CHLORIDE PRN MLS/HR: 5; 450 INJECTION, SOLUTION INTRAVENOUS at 10:14

## 2021-04-01 RX ADMIN — DOXYCYCLINE HYCLATE SCH MG: 100 TABLET, COATED ORAL at 20:51

## 2021-04-01 RX ADMIN — Medication SCH MG: at 15:28

## 2021-04-01 RX ADMIN — Medication SCH MG: at 10:53

## 2021-04-01 RX ADMIN — Medication SCH ML: at 17:44

## 2021-04-01 NOTE — NUR
MS RN NOTE



PATIENTS PTT IS 90.4 FROM 03/31, HEPARIN DRIP IS ON HOLD AS PER DR. ALONZO. ORDERED REPEAT 
BLOOD WORK PT/INR AND PTT. AWAITING RESULTS.

## 2021-04-01 NOTE — NUR
RECEIVED PATIENT IN BED, AWAKE, ANSWERS FEW WORDS WHEN ASKED. PATIENT IS ATTENTIVE. CALL 
LIGHT WITHIN REACH. BED ALARM ON. BED IN LOWEST AND LOCKED POSITION. NO S/S OF DISTRESS 
NOTED. HOB ELEVATED AT 35 DEGREES.

## 2021-04-01 NOTE — NUR
MS RN CLOSING NOTE



PATIENT IS IN BED RESTING, PATIENT IS IN NO ACUTE DISTRESS. NO SOB NOTED. PATIENT IS 3L 
OXYGEN NASAL CANNULA. NIGHT SHIFT ENDORSED REPORT PATIENT WAS ON HEPARIN DRIP, ON 3/31, 
PATIENTS PTT WAS 90.4 HEPARIN WAS PLACED ON HOLD. PHARMACY DC HEPARIN. DR. WU 
CONTINUED PATIENT ON HEPARIN, PLACED AN ORDER FOR STAT PT/INR AND PTT. AWAITING FOR THE 
RESULTS TO CONTINUE HEPARIN. SAFETY PRECAUTIONS ARE ON. BED IN THE LOWEST POSITION WITH SIDE 
RAILS UP, CALL LIGHT WITHIN REACH, ENDORSE PATIENT TO NIGHT SHIFT NURSE FOR LILLIE.

## 2021-04-01 NOTE — NUR
MS RN CLOSING NOTE



PATIENT RESTING COMFORTABLY IN BED, NO S/S OF RESPIRATORY DISTRESS, EASILY AROUSABLE. A/O X 
2-3. IV FLUIDS INFUSING WELL ON THE RFA. SAFETY PRECAUTIONS MAINTAINED THROUGHOUT THE SHIFT. 
ROUTINE MEDS GIVEN. WOUND CARE PROVIDED ON THE SACRAL AREA. PATIENT'S SISTER, SHARONDA, 
UPDATED ON PATIENT'S SITUATION. ATTENDED ALL PATIENT NEEDS. WILL ENDORSE TO NIGHT SHIFT 
NURSE FOR LILLIE.

## 2021-04-01 NOTE — NUR
MS RN OPENING NOTE



PATIENT IS IN BED RESTING, PATIENT IS IN NO ACUTE DISTRESS. NO SOB NOTED. PATIENT IS 3L 
OXYGEN NASAL CANNULA. NIGHT SHIFT ENDORSED REPORT PATIENT WAS ON HEPARIN DRIP, ON 3/31, 
PATIENTS PTT WAS 90.4 HEPARIN WAS PLACED ON HOLD. SAFETY PRECAUTIONS ARE ON. BED IN THE 
LOWEST POSITION WITH SIDE RAILS UP, CALL LIGHT WITHIN REACH, WILL CONTINUE TO MONITOR 
CLOSELY THROUGH OUT THE SHIFT.

## 2021-04-02 VITALS — DIASTOLIC BLOOD PRESSURE: 79 MMHG | SYSTOLIC BLOOD PRESSURE: 128 MMHG

## 2021-04-02 VITALS — DIASTOLIC BLOOD PRESSURE: 81 MMHG | SYSTOLIC BLOOD PRESSURE: 132 MMHG

## 2021-04-02 VITALS — SYSTOLIC BLOOD PRESSURE: 104 MMHG | DIASTOLIC BLOOD PRESSURE: 63 MMHG

## 2021-04-02 VITALS — SYSTOLIC BLOOD PRESSURE: 121 MMHG | DIASTOLIC BLOOD PRESSURE: 66 MMHG

## 2021-04-02 LAB
BASOPHILS # BLD AUTO: 0 /CMM (ref 0–0.2)
BASOPHILS NFR BLD AUTO: 0.3 % (ref 0–2)
BUN SERPL-MCNC: 20 MG/DL (ref 7–18)
CALCIUM SERPL-MCNC: 7.9 MG/DL (ref 8.5–10.1)
CHLORIDE SERPL-SCNC: 110 MMOL/L (ref 98–107)
CO2 SERPL-SCNC: 27 MMOL/L (ref 21–32)
CREAT SERPL-MCNC: 0.7 MG/DL (ref 0.6–1.3)
EOSINOPHIL NFR BLD AUTO: 4.9 % (ref 0–6)
GLUCOSE SERPL-MCNC: 94 MG/DL (ref 74–106)
HCT VFR BLD AUTO: 33 % (ref 33–45)
HGB BLD-MCNC: 10.1 G/DL (ref 11.5–14.8)
LYMPHOCYTES NFR BLD AUTO: 0.7 /CMM (ref 0.8–4.8)
LYMPHOCYTES NFR BLD AUTO: 4.8 % (ref 20–44)
MCHC RBC AUTO-ENTMCNC: 31 G/DL (ref 31–36)
MCV RBC AUTO: 94 FL (ref 82–100)
MONOCYTES NFR BLD AUTO: 1 /CMM (ref 0.1–1.3)
MONOCYTES NFR BLD AUTO: 6.7 % (ref 2–12)
NEUTROPHILS # BLD AUTO: 11.9 /CMM (ref 1.8–8.9)
NEUTROPHILS NFR BLD AUTO: 83.3 % (ref 43–81)
PLATELET # BLD AUTO: 114 /CMM (ref 150–450)
POTASSIUM SERPL-SCNC: 4.2 MMOL/L (ref 3.5–5.1)
RBC # BLD AUTO: 3.55 MIL/UL (ref 4–5.2)
SODIUM SERPL-SCNC: 144 MMOL/L (ref 136–145)
WBC NRBC COR # BLD AUTO: 14.3 K/UL (ref 4.3–11)

## 2021-04-02 RX ADMIN — METOPROLOL TARTRATE SCH MG: 50 TABLET, FILM COATED ORAL at 17:10

## 2021-04-02 RX ADMIN — ASPIRIN 81 MG SCH MG: 81 TABLET ORAL at 08:27

## 2021-04-02 RX ADMIN — METOPROLOL TARTRATE SCH MG: 50 TABLET, FILM COATED ORAL at 12:18

## 2021-04-02 RX ADMIN — HEPARIN SODIUM PRN MLS/HR: 5000 INJECTION, SOLUTION INTRAVENOUS at 22:10

## 2021-04-02 RX ADMIN — ALBUTEROL SULFATE SCH MG: 2.5 SOLUTION RESPIRATORY (INHALATION) at 19:58

## 2021-04-02 RX ADMIN — DEXTROSE AND SODIUM CHLORIDE PRN MLS/HR: 5; 450 INJECTION, SOLUTION INTRAVENOUS at 16:38

## 2021-04-02 RX ADMIN — Medication SCH MG: at 19:58

## 2021-04-02 RX ADMIN — Medication SCH APPLIC: at 08:44

## 2021-04-02 RX ADMIN — Medication SCH APPLIC: at 16:45

## 2021-04-02 RX ADMIN — ALBUTEROL SULFATE SCH MG: 2.5 SOLUTION RESPIRATORY (INHALATION) at 15:40

## 2021-04-02 RX ADMIN — DOXYCYCLINE HYCLATE SCH MG: 100 TABLET, COATED ORAL at 08:27

## 2021-04-02 RX ADMIN — Medication SCH ML: at 12:18

## 2021-04-02 RX ADMIN — PANTOPRAZOLE SODIUM SCH MG: 40 TABLET, DELAYED RELEASE ORAL at 08:27

## 2021-04-02 RX ADMIN — Medication SCH MG: at 10:38

## 2021-04-02 RX ADMIN — Medication SCH MG: at 15:41

## 2021-04-02 RX ADMIN — ALBUTEROL SULFATE SCH MG: 2.5 SOLUTION RESPIRATORY (INHALATION) at 10:38

## 2021-04-02 RX ADMIN — Medication SCH ML: at 08:44

## 2021-04-02 RX ADMIN — LORATADINE SCH MG: 10 TABLET ORAL at 08:27

## 2021-04-02 RX ADMIN — DEXTROSE AND SODIUM CHLORIDE PRN MLS/HR: 5; 450 INJECTION, SOLUTION INTRAVENOUS at 06:10

## 2021-04-02 RX ADMIN — Medication SCH ML: at 16:22

## 2021-04-02 RX ADMIN — Medication SCH MG: at 07:54

## 2021-04-02 RX ADMIN — ALBUTEROL SULFATE SCH MG: 2.5 SOLUTION RESPIRATORY (INHALATION) at 07:54

## 2021-04-02 RX ADMIN — METOPROLOL TARTRATE SCH MG: 50 TABLET, FILM COATED ORAL at 00:19

## 2021-04-02 RX ADMIN — METOPROLOL TARTRATE SCH MG: 50 TABLET, FILM COATED ORAL at 06:10

## 2021-04-02 RX ADMIN — DOXYCYCLINE HYCLATE SCH MG: 100 TABLET, COATED ORAL at 21:08

## 2021-04-02 NOTE — NUR
RN NOTES



PATIENT WAS SEEN BY DALE TAVARES NP, TODAY W/ ORDER FOR NORCO 5-325 Q4H PRN FOR PAIN AS 
PATIENT COMPLAINT OF BACK DISCOMFORT. PATIENT WAS ASSESSED AND ASKED IF SHE'S IN PAIN; 
PATIENT DID NOT COMPLAIN OF PAIN AT THIS TIME AND WAS PREVIOUSLY REPOSITIONED FOR COMFORT. 
ASKED IF SHE WANTS ANY MEDICATION FOR PAIN BUT PATIENT REFUSED AT THIS TIME. INSTRUCTED TO 
USE CALL LIGHT FOR ASSISTANCE AND TO INFORM STAFF IF SHE WOULD LIKE ANY MEDICATION OF 
EXPERIENCING PAIN. WILL CONTINUE TO MONITOR

## 2021-04-02 NOTE — NUR
MS/RN NOTES 



RECEIVED PATIENT IN BED RESTING. PATIENT IS ALERT AND ORIENTED X 2-3. PATIENT BREATHING IS 
EVEN AND UNLABORED. NO SOB OR RESPIRATORY DISTRESS NOTED. PATIENT PLACED IN COMFORTABLE 
POSITION. PATIENT RUNNING HEPARIN DRIP AT 11OO UNITS/KG/HR ( 22ML/HR) PER PROTOCOL. SAFETY 
MEASURES ARE IN PLACE, BED IS LOCKED AND PLACED IN THE LOW POSITION, SIDE RAILS UP X 3, CALL 
LIGHT IS WITHIN REACH. WILL CONTINUE WITH PATIENT PLAN OF CARE.

## 2021-04-02 NOTE — NUR
RN NOTES



RECEIVED PTT RESULT OF 54.3; PER HEPARIN DOSING PROTOCOL, CONTINUE SAME DOSE OF 
1100UNITS/KG/HR (22ML/HR). NO S/SX OF BLEEDING AT THIS TIME. WILL CONTINUE MONITOR.

## 2021-04-02 NOTE — NUR
MS/RN NOTES 



HEPARIN DRIP PER PROTOCOL DOSE RATE RUNNING AT 1100UNITS/HR, 22ML/HR. PATIENT SHOWS NO SIGNS 
OF ACTIVE BLEEDING. PTT ORDERED FOR 4/3/21 AT 0600HRS.

## 2021-04-02 NOTE — NUR
MS RN OPENING NOTES



PATIENT IS IN BED RESTING, PATIENT IS IN NO ACUTE DISTRESS. NO SOB NOTED. NIGHT SHIFT 
ENDORSED REPORT PATIENT WAS ON HEPARIN DRIP, PATIENTS PTT . HEPARIN WAS PLACED ON 
HOLD FOR ONE HOUR PER REPORT.  RESTARTED @ 0730 @ 1100 UNITS PER PROTOCOL. SAFETY 
PRECAUTIONS ARE ON. BED IN THE LOWEST POSITION WITH SIDE RAILS UP, CALL LIGHT WITHIN REACH, 
WILL CONTINUE TO MONITOR CLOSELY THROUGH OUT THE SHIFT. WILL HAVE PTT REDRAW @ 1230.

## 2021-04-02 NOTE — NUR
RN NOTES



RECEIVED CALL FROM SHARONDA, PATIENT'S SISTER, AND UPDATED ABOUT PATIENT'S CURRENT STATUS 
AND CONDITION.

## 2021-04-02 NOTE — NUR
MS RN CLOSING NOTES



PATIENT IS IN BED RESTING, PATIENT IS IN NO ACUTE DISTRESS. NO SOB NOTED. PATIENT IS 
BREATHING EVEN AND UNLABORED @ 2 LPM NASAL CANNUAL. IVF @ 70 ML/HR D5 1/2 NS RIGHT HAND.  
PATIENTS PTT WAS  5.4 WHICH SHOWED TO CONTINUE RATE. HEPARIN IS AT 1100 UNITS PER PROTOCOL 
ON RIGHT FOREARM. NEXT HEPARIN WILL BE @ 0600 AM PER PROTOCOL.  PATIENT COMPLAINED OF BACK 
PAIN DURING SHIFT REPOSITIONING HELPED, MD ORDERED NORCO 5/325MG PRN FOR PAIN, PATIENT DID 
NOT WANT MEDICATION AT THAT TIME. NEEDS MET THROUGHOUT SHIFT. SAFETY PRECAUTIONS ARE ON. BED 
IN THE LOWEST POSITION WITH SIDE RAILS UP, CALL LIGHT WITHIN REACH.

## 2021-04-02 NOTE — NUR
RECEIVED A CALL FROM LAB FOR [id number], HELD HEPARIN FOR ONE HOUR PER PROTOCOL AND WILL 
DECREASE DRIP BY 3UNITS AFTER AN HOUR, WILL ENDORSE TO THE NEXT SHIFT RN. NEXT PTT DRAW WILL 
BE AT 1230.

-------------------------------------------------------------------------------

Addendum: 04/02/21 at 0650 by BRANDON LIMON RN

-------------------------------------------------------------------------------

DECREASE BY 3 UNITS/KG/H.

## 2021-04-03 VITALS — SYSTOLIC BLOOD PRESSURE: 161 MMHG | DIASTOLIC BLOOD PRESSURE: 76 MMHG

## 2021-04-03 VITALS — DIASTOLIC BLOOD PRESSURE: 65 MMHG | SYSTOLIC BLOOD PRESSURE: 104 MMHG

## 2021-04-03 VITALS — DIASTOLIC BLOOD PRESSURE: 61 MMHG | SYSTOLIC BLOOD PRESSURE: 112 MMHG

## 2021-04-03 LAB
BASOPHILS # BLD AUTO: 0 /CMM (ref 0–0.2)
BASOPHILS NFR BLD AUTO: 0.3 % (ref 0–2)
BUN SERPL-MCNC: 17 MG/DL (ref 7–18)
CALCIUM SERPL-MCNC: 7.5 MG/DL (ref 8.5–10.1)
CHLORIDE SERPL-SCNC: 109 MMOL/L (ref 98–107)
CO2 SERPL-SCNC: 29 MMOL/L (ref 21–32)
CREAT SERPL-MCNC: 0.8 MG/DL (ref 0.6–1.3)
EOSINOPHIL NFR BLD AUTO: 14.9 % (ref 0–6)
GLUCOSE SERPL-MCNC: 83 MG/DL (ref 74–106)
HCT VFR BLD AUTO: 27 % (ref 33–45)
HGB BLD-MCNC: 8.5 G/DL (ref 11.5–14.8)
LYMPHOCYTES NFR BLD AUTO: 0.7 /CMM (ref 0.8–4.8)
LYMPHOCYTES NFR BLD AUTO: 4.6 % (ref 20–44)
MCHC RBC AUTO-ENTMCNC: 31 G/DL (ref 31–36)
MCV RBC AUTO: 93 FL (ref 82–100)
MONOCYTES NFR BLD AUTO: 0.9 /CMM (ref 0.1–1.3)
MONOCYTES NFR BLD AUTO: 6 % (ref 2–12)
NEUTROPHILS # BLD AUTO: 10.5 /CMM (ref 1.8–8.9)
NEUTROPHILS NFR BLD AUTO: 74.2 % (ref 43–81)
PLATELET # BLD AUTO: 102 /CMM (ref 150–450)
POTASSIUM SERPL-SCNC: 3.8 MMOL/L (ref 3.5–5.1)
RBC # BLD AUTO: 2.96 MIL/UL (ref 4–5.2)
SODIUM SERPL-SCNC: 145 MMOL/L (ref 136–145)
WBC NRBC COR # BLD AUTO: 14.2 K/UL (ref 4.3–11)

## 2021-04-03 RX ADMIN — ASPIRIN 81 MG SCH MG: 81 TABLET ORAL at 08:32

## 2021-04-03 RX ADMIN — LORATADINE SCH MG: 10 TABLET ORAL at 08:32

## 2021-04-03 RX ADMIN — Medication SCH APPLIC: at 08:43

## 2021-04-03 RX ADMIN — Medication SCH APPLIC: at 17:35

## 2021-04-03 RX ADMIN — METOPROLOL TARTRATE SCH MG: 50 TABLET, FILM COATED ORAL at 23:54

## 2021-04-03 RX ADMIN — Medication SCH MG: at 16:15

## 2021-04-03 RX ADMIN — METOPROLOL TARTRATE SCH MG: 50 TABLET, FILM COATED ORAL at 17:44

## 2021-04-03 RX ADMIN — Medication SCH ML: at 08:43

## 2021-04-03 RX ADMIN — DEXTROSE AND SODIUM CHLORIDE PRN MLS/HR: 5; 450 INJECTION, SOLUTION INTRAVENOUS at 18:23

## 2021-04-03 RX ADMIN — Medication SCH MG: at 07:45

## 2021-04-03 RX ADMIN — Medication SCH ML: at 17:35

## 2021-04-03 RX ADMIN — METOPROLOL TARTRATE SCH MG: 50 TABLET, FILM COATED ORAL at 12:11

## 2021-04-03 RX ADMIN — Medication SCH ML: at 12:16

## 2021-04-03 RX ADMIN — HEPARIN SODIUM PRN MLS/HR: 5000 INJECTION, SOLUTION INTRAVENOUS at 15:11

## 2021-04-03 RX ADMIN — DOXYCYCLINE HYCLATE SCH MG: 100 TABLET, COATED ORAL at 21:33

## 2021-04-03 RX ADMIN — ALBUTEROL SULFATE SCH MG: 2.5 SOLUTION RESPIRATORY (INHALATION) at 20:13

## 2021-04-03 RX ADMIN — ALBUTEROL SULFATE SCH MG: 2.5 SOLUTION RESPIRATORY (INHALATION) at 16:15

## 2021-04-03 RX ADMIN — METOPROLOL TARTRATE SCH MG: 50 TABLET, FILM COATED ORAL at 06:04

## 2021-04-03 RX ADMIN — ALBUTEROL SULFATE SCH MG: 2.5 SOLUTION RESPIRATORY (INHALATION) at 07:45

## 2021-04-03 RX ADMIN — ALBUTEROL SULFATE SCH MG: 2.5 SOLUTION RESPIRATORY (INHALATION) at 11:40

## 2021-04-03 RX ADMIN — METOPROLOL TARTRATE SCH MG: 50 TABLET, FILM COATED ORAL at 00:00

## 2021-04-03 RX ADMIN — HEPARIN SODIUM PRN MLS/HR: 5000 INJECTION, SOLUTION INTRAVENOUS at 08:50

## 2021-04-03 RX ADMIN — Medication SCH MG: at 20:13

## 2021-04-03 RX ADMIN — PANTOPRAZOLE SODIUM SCH MG: 40 TABLET, DELAYED RELEASE ORAL at 07:41

## 2021-04-03 RX ADMIN — HEPARIN SODIUM SCH UNITS: 5000 INJECTION INTRAVENOUS; SUBCUTANEOUS at 21:38

## 2021-04-03 RX ADMIN — DOXYCYCLINE HYCLATE SCH MG: 100 TABLET, COATED ORAL at 08:32

## 2021-04-03 RX ADMIN — Medication SCH MG: at 11:40

## 2021-04-03 NOTE — NUR
MS/RN OPENING NOTES 



RECEIVED PATIENT IN BED RESTING. PATIENT IS ALERT AND ORIENTED X 3. PATIENT BREATHING IS 
EVEN AND UNLABORED. NO SOB OR RESPIRATORY DISTRESS NOTED. PATIENT PLACED IN COMFORTABLE 
POSITION. SAFETY MEASURES ARE IN PLACE, BED IS LOCKED AND PLACED IN THE LOW POSITION, SIDE 
RAILS UP X 3, CALL LIGHT IS WITHIN REACH. WILL CONTINUE WITH PATIENT PLAN OF CARE.

## 2021-04-03 NOTE — NUR
RN MS NOTES



LATEST PTT IS 48.3 THIS AFTERNOON. DOSAGE NO CHANGE. STILL 850 UNITS/HOUR (17 ML/HOUR). NEXT 
PTT DRAW IS A 0600 ON 4/4/21. WILL CONTINUE TO MONITOR THE PT.

## 2021-04-03 NOTE — NUR
MS RN CLOSING NOTES 



PT WAS SEEN AWAKE IN BED IN NO ACUTE SIGNS OF DISTRESS. A/O  X 2. PT CAN BE FORGETFUL AT 
TIMES. NO S/S OF PAIN  NOTED. PT IS ON DNR/DNI STATUS. PT ON 02 VIA N/C AT 2LPM, BREATHING 
IS EVEN AND UNLABORED. IVF OF D5 1/2 NS INFUSING WELL TO RIGHT FOREARM G#20, NO S/S OF 
INFILTRATION AT SITE NOTED. SAFETY MEASURES IN PLACE: BED IS LOCKED AND IN  LOW POSITION 
WITH SIDE RAILS UP X 3. CALL LIGHT IS WITHIN REACH. WILL ENDORSE CONTINUITY OF CARE TO NIGHT 
SHIFT NURSE.

## 2021-04-03 NOTE — NUR
RN NOTES



DR ALONZO CAME AND ASSESSED PT. HEPARIN DRIP DISCONTINUED AND CHANGED PT ON HEPARIN SQ. WILL 
CONTINUE TO MONITOR.

## 2021-04-03 NOTE — NUR
MS/RN NOTES 



PATIENT BLOOD PRESSURE NOTED /59, P 71, RR 18 TEMP 98.5, OZ 96%. METOPROLOL 25 MG PO 
HELD FOR 0000HRS. PATIENT IN NO SIGNS OF ACUTE DISTRESS.

## 2021-04-03 NOTE — NUR
MS/RN CLOSING NOTES 



PATIENT IN BED RESTING. PATIENT IS ALERT AND ORIENTED X 2. PATIENT BREATHING IS EVEN AND 
UNLABORED. NO SOB OR RESPIRATORY DISTRESS NOTED. PATIENT PLACED IN COMFORTABLE POSITION. 
PATIENT RUNNING HEPARIN DRIP AT 11OO UNITS/KG/HR ( 22ML/HR) PER PROTOCOL.  PATIENT BM X 2, 
CLEAN AND KEEP DRY, WOUND TX DONE, PATIENT TURNED AND REPOSITIONED Q2H. ALL NEEDS HAVE BEEN 
MET DURING SHIFT. SAFETY MEASURES ARE IN PLACE, BED IS LOCKED AND PLACED IN THE LOW 
POSITION, SIDE RAILS UP X 3, CALL LIGHT IS WITHIN REACH. WILL ENDORSE CARE TO DAY SHIFT 
NURSE.

## 2021-04-03 NOTE — NUR
RN NOTES



RECEIVED CALL FROM [employer] THAT PT'S APTT THIS MORNING WAS 98.6, HOLD HEPARIN FOR 1 
HOUR, THEN DECREASE DRIP BY 3 UNITS/KG/HOUR, PER HEPARIN PROTOCOL. NEXT PTT DRAW AT 1350.

## 2021-04-03 NOTE — NUR
RN NOTES



HEPARIN DRIP RE-STARTED AT 0850  U/HR (17ML/HR) AFTER HOLDING DRIP FOR ONE HOUR PER 
HEPARIN PROTOCOL RESULT FROM APTT THIS MORNING. NO S/S OR ACTIVE BLEEDING NOTED.  WILL 
CONTINUE TO MONITOR PT.

## 2021-04-03 NOTE — NUR
MS RN CLOSING NOTES 



RECEIVED PATIENT AWAKE IN BED IN NO ACUTE SIGN SOF DISTRESS. HOB ELEVATED.  A/O  X 2. 
VERBALLY RESPONSIVE, APPEARS CONFUSED BUT NO S/S OF PAIN  NOTED. PT IS ON DNR/DNI STATUS. ON 
02 VIA N/C AT 2LPM, BREATHING IS EVEN AND UNLABORED. ON HEPARIN DRIP AT 11OO UNITS/KG/HR ( 
22ML/HR) AT THIS TIME VIA RFA G#22, AWAITING RESULTS OF PTT THIS MORNING. IVF OF D51/2 NS 
INFUSING WELL TO RIGHT HAND G#22, NO S/S OF INFILTRATION AT SITE NOTED. SAFETY MEASURES IN 
PLACE: BED IS LOCKED AND IN  LOW POSITION WITH SIDE RAILS UP X 3. CALL LIGHT IS WITHIN 
REACH. WILL CONTINUE TO MONITOR PT ACCORDINGLY. .

## 2021-04-04 VITALS — SYSTOLIC BLOOD PRESSURE: 133 MMHG | DIASTOLIC BLOOD PRESSURE: 62 MMHG

## 2021-04-04 VITALS — SYSTOLIC BLOOD PRESSURE: 113 MMHG | DIASTOLIC BLOOD PRESSURE: 93 MMHG

## 2021-04-04 VITALS — DIASTOLIC BLOOD PRESSURE: 69 MMHG | SYSTOLIC BLOOD PRESSURE: 117 MMHG

## 2021-04-04 LAB
BASOPHILS # BLD AUTO: 0 /CMM (ref 0–0.2)
BASOPHILS NFR BLD AUTO: 0.3 % (ref 0–2)
BUN SERPL-MCNC: 20 MG/DL (ref 7–18)
CALCIUM SERPL-MCNC: 7.7 MG/DL (ref 8.5–10.1)
CHLORIDE SERPL-SCNC: 109 MMOL/L (ref 98–107)
CO2 SERPL-SCNC: 28 MMOL/L (ref 21–32)
CREAT SERPL-MCNC: 0.6 MG/DL (ref 0.6–1.3)
EOSINOPHIL NFR BLD AUTO: 8.2 % (ref 0–6)
GLUCOSE SERPL-MCNC: 71 MG/DL (ref 74–106)
HCT VFR BLD AUTO: 27 % (ref 33–45)
HGB BLD-MCNC: 8.3 G/DL (ref 11.5–14.8)
LYMPHOCYTES NFR BLD AUTO: 0.6 /CMM (ref 0.8–4.8)
LYMPHOCYTES NFR BLD AUTO: 6 % (ref 20–44)
MCHC RBC AUTO-ENTMCNC: 31 G/DL (ref 31–36)
MCV RBC AUTO: 94 FL (ref 82–100)
MONOCYTES NFR BLD AUTO: 0.6 /CMM (ref 0.1–1.3)
MONOCYTES NFR BLD AUTO: 6.4 % (ref 2–12)
NEUTROPHILS # BLD AUTO: 7.8 /CMM (ref 1.8–8.9)
NEUTROPHILS NFR BLD AUTO: 79.1 % (ref 43–81)
PLATELET # BLD AUTO: 95 /CMM (ref 150–450)
POTASSIUM SERPL-SCNC: 3.8 MMOL/L (ref 3.5–5.1)
RBC # BLD AUTO: 2.85 MIL/UL (ref 4–5.2)
SODIUM SERPL-SCNC: 145 MMOL/L (ref 136–145)
WBC NRBC COR # BLD AUTO: 9.9 K/UL (ref 4.3–11)

## 2021-04-04 RX ADMIN — ALBUTEROL SULFATE SCH MG: 2.5 SOLUTION RESPIRATORY (INHALATION) at 15:07

## 2021-04-04 RX ADMIN — Medication SCH ML: at 08:34

## 2021-04-04 RX ADMIN — HEPARIN SODIUM SCH UNITS: 5000 INJECTION INTRAVENOUS; SUBCUTANEOUS at 05:19

## 2021-04-04 RX ADMIN — METOPROLOL TARTRATE SCH MG: 50 TABLET, FILM COATED ORAL at 11:35

## 2021-04-04 RX ADMIN — HEPARIN SODIUM SCH UNITS: 5000 INJECTION INTRAVENOUS; SUBCUTANEOUS at 12:09

## 2021-04-04 RX ADMIN — DOXYCYCLINE HYCLATE SCH MG: 100 TABLET, COATED ORAL at 08:34

## 2021-04-04 RX ADMIN — Medication SCH APPLIC: at 17:30

## 2021-04-04 RX ADMIN — ALBUTEROL SULFATE SCH MG: 2.5 SOLUTION RESPIRATORY (INHALATION) at 20:23

## 2021-04-04 RX ADMIN — Medication SCH MG: at 20:23

## 2021-04-04 RX ADMIN — METOPROLOL TARTRATE SCH MG: 50 TABLET, FILM COATED ORAL at 05:16

## 2021-04-04 RX ADMIN — Medication SCH APPLIC: at 08:35

## 2021-04-04 RX ADMIN — PANTOPRAZOLE SODIUM SCH MG: 40 TABLET, DELAYED RELEASE ORAL at 08:38

## 2021-04-04 RX ADMIN — METOPROLOL TARTRATE SCH MG: 50 TABLET, FILM COATED ORAL at 17:31

## 2021-04-04 RX ADMIN — LORATADINE SCH MG: 10 TABLET ORAL at 08:34

## 2021-04-04 RX ADMIN — ALBUTEROL SULFATE SCH MG: 2.5 SOLUTION RESPIRATORY (INHALATION) at 11:12

## 2021-04-04 RX ADMIN — Medication SCH ML: at 12:09

## 2021-04-04 RX ADMIN — Medication SCH MG: at 15:07

## 2021-04-04 RX ADMIN — ASPIRIN 81 MG SCH MG: 81 TABLET ORAL at 08:34

## 2021-04-04 RX ADMIN — Medication SCH MG: at 11:12

## 2021-04-04 RX ADMIN — Medication SCH ML: at 17:30

## 2021-04-04 RX ADMIN — HEPARIN SODIUM SCH UNITS: 5000 INJECTION INTRAVENOUS; SUBCUTANEOUS at 21:39

## 2021-04-04 RX ADMIN — Medication SCH MG: at 07:44

## 2021-04-04 RX ADMIN — ALBUTEROL SULFATE SCH MG: 2.5 SOLUTION RESPIRATORY (INHALATION) at 07:44

## 2021-04-04 NOTE — NUR
MS/RN CLOSING NOTES 



PATIENT IN BED RESTING. PATIENT IS ALERT AND ORIENTED X 3. PATIENT BREATHING IS EVEN AND 
UNLABORED. NO SOB OR RESPIRATORY DISTRESS NOTED. ALL PATIENT NEEDS HAVE BEEN MET DURING 
SHIFT. SAFETY MEASURES ARE IN PLACE, BED IS LOCKED AND PLACED IN THE LOW POSITION, SIDE 
RAILS UP X 3, CALL LIGHT IS WITHIN REACH

## 2021-04-04 NOTE — NUR
MS/RN CLOSING NOTES 



PATIENT IN BED RESTING. PATIENT IS ALERT AND ORIENTED X 3. PATIENT BREATHING IS EVEN AND 
UNLABORED. NO SOB OR RESPIRATORY DISTRESS NOTED. ALL PATIENT NEEDS HAVE BEEN MET DURING 
SHIFT. SAFETY MEASURES ARE IN PLACE, BED IS LOCKED AND PLACED IN THE LOW POSITION, SIDE 
RAILS UP X 3, CALL LIGHT IS WITHIN REACH. WILL ENDORSE TO DAY SHIFT NURSE.

## 2021-04-04 NOTE — NUR
MSRN OPENING NOTES 





 PATIENT IN BED RESTING. PATIENT IS ALERT AND ORIENTED X 2/3 PATIENT BREATHING IS EVEN AND 
UNLABORED. NO SOB OR RESPIRATORY DISTRESS NOTED. PATIENT PLACED IN COMFORTABLE POSITION. 
SAFETY MEASURES ARE IN PLACE, BED IS LOCKED AND PLACED IN THE LOW POSITION, SIDE RAILS UP X 
3, CALL LIGHT IS WITHIN REACH.

## 2021-04-05 VITALS — DIASTOLIC BLOOD PRESSURE: 74 MMHG | SYSTOLIC BLOOD PRESSURE: 125 MMHG

## 2021-04-05 VITALS — SYSTOLIC BLOOD PRESSURE: 104 MMHG | DIASTOLIC BLOOD PRESSURE: 66 MMHG

## 2021-04-05 VITALS — DIASTOLIC BLOOD PRESSURE: 65 MMHG | SYSTOLIC BLOOD PRESSURE: 104 MMHG

## 2021-04-05 LAB
BASOPHILS # BLD AUTO: 0 /CMM (ref 0–0.2)
BASOPHILS NFR BLD AUTO: 0.2 % (ref 0–2)
BUN SERPL-MCNC: 19 MG/DL (ref 7–18)
CALCIUM SERPL-MCNC: 8.5 MG/DL (ref 8.5–10.1)
CHLORIDE SERPL-SCNC: 105 MMOL/L (ref 98–107)
CO2 SERPL-SCNC: 28 MMOL/L (ref 21–32)
CREAT SERPL-MCNC: 0.7 MG/DL (ref 0.6–1.3)
EOSINOPHIL NFR BLD AUTO: 18.2 % (ref 0–6)
GLUCOSE SERPL-MCNC: 64 MG/DL (ref 74–106)
HCT VFR BLD AUTO: 32 % (ref 33–45)
HGB BLD-MCNC: 10 G/DL (ref 11.5–14.8)
LYMPHOCYTES NFR BLD AUTO: 0.7 /CMM (ref 0.8–4.8)
LYMPHOCYTES NFR BLD AUTO: 3.4 % (ref 20–44)
MCHC RBC AUTO-ENTMCNC: 31 G/DL (ref 31–36)
MCV RBC AUTO: 92 FL (ref 82–100)
MONOCYTES NFR BLD AUTO: 0.8 /CMM (ref 0.1–1.3)
MONOCYTES NFR BLD AUTO: 4.3 % (ref 2–12)
NEUTROPHILS # BLD AUTO: 14.5 /CMM (ref 1.8–8.9)
NEUTROPHILS NFR BLD AUTO: 73.9 % (ref 43–81)
PLATELET # BLD AUTO: 117 /CMM (ref 150–450)
POTASSIUM SERPL-SCNC: 3.8 MMOL/L (ref 3.5–5.1)
RBC # BLD AUTO: 3.48 MIL/UL (ref 4–5.2)
SODIUM SERPL-SCNC: 143 MMOL/L (ref 136–145)
WBC NRBC COR # BLD AUTO: 19.6 K/UL (ref 4.3–11)

## 2021-04-05 RX ADMIN — HEPARIN SODIUM SCH UNITS: 5000 INJECTION INTRAVENOUS; SUBCUTANEOUS at 12:15

## 2021-04-05 RX ADMIN — Medication SCH APPLIC: at 08:39

## 2021-04-05 RX ADMIN — Medication SCH APPLIC: at 16:15

## 2021-04-05 RX ADMIN — Medication SCH ML: at 16:15

## 2021-04-05 RX ADMIN — METOPROLOL TARTRATE SCH MG: 50 TABLET, FILM COATED ORAL at 00:21

## 2021-04-05 RX ADMIN — METOPROLOL TARTRATE SCH MG: 50 TABLET, FILM COATED ORAL at 12:14

## 2021-04-05 RX ADMIN — PANTOPRAZOLE SODIUM SCH MG: 40 TABLET, DELAYED RELEASE ORAL at 08:28

## 2021-04-05 RX ADMIN — Medication SCH ML: at 08:38

## 2021-04-05 RX ADMIN — Medication SCH MG: at 15:13

## 2021-04-05 RX ADMIN — ASPIRIN 81 MG SCH MG: 81 TABLET ORAL at 08:29

## 2021-04-05 RX ADMIN — METOPROLOL TARTRATE SCH MG: 50 TABLET, FILM COATED ORAL at 17:52

## 2021-04-05 RX ADMIN — LORATADINE SCH MG: 10 TABLET ORAL at 08:29

## 2021-04-05 RX ADMIN — HEPARIN SODIUM SCH UNITS: 5000 INJECTION INTRAVENOUS; SUBCUTANEOUS at 05:12

## 2021-04-05 RX ADMIN — METOPROLOL TARTRATE SCH MG: 50 TABLET, FILM COATED ORAL at 05:11

## 2021-04-05 RX ADMIN — ALBUTEROL SULFATE SCH MG: 2.5 SOLUTION RESPIRATORY (INHALATION) at 15:13

## 2021-04-05 RX ADMIN — Medication SCH MG: at 11:30

## 2021-04-05 RX ADMIN — Medication SCH MG: at 07:57

## 2021-04-05 RX ADMIN — ALBUTEROL SULFATE SCH MG: 2.5 SOLUTION RESPIRATORY (INHALATION) at 11:30

## 2021-04-05 RX ADMIN — ALBUTEROL SULFATE SCH MG: 2.5 SOLUTION RESPIRATORY (INHALATION) at 07:57

## 2021-04-05 RX ADMIN — Medication SCH ML: at 12:15

## 2021-04-05 NOTE — NUR
RN NOTES



OBTAIN NASAL SPECIMEN FOR COVID-19 TEST. PATIENT TOLERATED WELL. WILL DELIVER TO THE LAB.

## 2021-04-05 NOTE — NUR
RN NOTES



RECEIVED A CALL FROM . PATIENT WILL BE GOING TO A [facility]. THE FACILITY 
REQUESTED A RAPID COVID-19 TEST TO BE PERFORMED BEFORE TRANSFER. LAB AWARE WILL  TEST 
KIT.

## 2021-04-05 NOTE — NUR
MS RN OPENING NOTES 





 PATIENT IN BED AWAKE, RESTING. PATIENT IS ALERT AND ORIENTED X 2/3, BUT ABLE TO MAKE NEEDS 
KNOWN. PATIENT BREATHING IS EVEN AND UNLABORED. PATIENT IS ON 2LPM VIA NASAL CANNULA 
SATURATION 96% PER REPORT.  NO SOB OR RESPIRATORY DISTRESS NOTED. PATIENT PLACED IN 
COMFORTABLE POSITION. SAFETY MEASURES ARE IN PLACE, BED IS LOCKED AND PLACED IN THE LOW 
POSITION, SIDE RAILS UP X 3, CALL LIGHT IS WITHIN REACH. WILL CONTINUE TO MONITOR THROUGHOUT 
SHIFT.

## 2022-11-01 NOTE — NUR
CALLED THE EPIC RE: TO INFORM MD FOR THE EELZ=344.3 SECONDS, WILL PAGE THE DOCTOR TO CALL 
BACK. CHARGE NURSE NEYDA MADE AWARE. No